# Patient Record
Sex: FEMALE | Race: WHITE | NOT HISPANIC OR LATINO | Employment: FULL TIME | URBAN - METROPOLITAN AREA
[De-identification: names, ages, dates, MRNs, and addresses within clinical notes are randomized per-mention and may not be internally consistent; named-entity substitution may affect disease eponyms.]

---

## 2017-11-25 ENCOUNTER — APPOINTMENT (OUTPATIENT)
Dept: GENERAL RADIOLOGY | Facility: HOSPITAL | Age: 57
End: 2017-11-25

## 2017-11-25 ENCOUNTER — APPOINTMENT (OUTPATIENT)
Dept: CT IMAGING | Facility: HOSPITAL | Age: 57
End: 2017-11-25

## 2017-11-25 ENCOUNTER — HOSPITAL ENCOUNTER (EMERGENCY)
Facility: HOSPITAL | Age: 57
Discharge: HOME | End: 2017-11-25
Attending: EMERGENCY MEDICINE | Admitting: EMERGENCY MEDICINE

## 2017-11-25 VITALS
HEART RATE: 99 BPM | SYSTOLIC BLOOD PRESSURE: 136 MMHG | TEMPERATURE: 98.9 F | BODY MASS INDEX: 27.6 KG/M2 | HEIGHT: 62 IN | RESPIRATION RATE: 17 BRPM | DIASTOLIC BLOOD PRESSURE: 87 MMHG | WEIGHT: 150 LBS | OXYGEN SATURATION: 94 %

## 2017-11-25 DIAGNOSIS — B34.9 VIRAL SYNDROME: Primary | ICD-10-CM

## 2017-11-25 DIAGNOSIS — K62.5 RECTAL BLEEDING: ICD-10-CM

## 2017-11-25 LAB
ALBUMIN SERPL-MCNC: 4.3 G/DL (ref 3.5–5.2)
ALBUMIN/GLOB SERPL: 1.4 G/DL
ALP SERPL-CCNC: 90 U/L (ref 39–117)
ALT SERPL W P-5'-P-CCNC: 61 U/L (ref 1–33)
ANION GAP SERPL CALCULATED.3IONS-SCNC: 19.3 MMOL/L
AST SERPL-CCNC: 32 U/L (ref 1–32)
B PERT DNA SPEC QL NAA+PROBE: NOT DETECTED
BASOPHILS # BLD AUTO: 0.02 10*3/MM3 (ref 0–0.2)
BASOPHILS NFR BLD AUTO: 0.1 % (ref 0–1.5)
BILIRUB SERPL-MCNC: 0.9 MG/DL (ref 0.1–1.2)
BUN BLD-MCNC: 6 MG/DL (ref 6–20)
BUN/CREAT SERPL: 8.5 (ref 7–25)
C PNEUM DNA NPH QL NAA+NON-PROBE: NOT DETECTED
CALCIUM SPEC-SCNC: 8.7 MG/DL (ref 8.6–10.5)
CHLORIDE SERPL-SCNC: 94 MMOL/L (ref 98–107)
CO2 SERPL-SCNC: 20.7 MMOL/L (ref 22–29)
CREAT BLD-MCNC: 0.71 MG/DL (ref 0.57–1)
D-LACTATE SERPL-SCNC: 2.6 MMOL/L (ref 0.5–2)
DEPRECATED RDW RBC AUTO: 46.9 FL (ref 37–54)
EOSINOPHIL # BLD AUTO: 0.01 10*3/MM3 (ref 0–0.7)
EOSINOPHIL NFR BLD AUTO: 0.1 % (ref 0.3–6.2)
ERYTHROCYTE [DISTWIDTH] IN BLOOD BY AUTOMATED COUNT: 13 % (ref 11.7–13)
FLUAV H1 2009 PAND RNA NPH QL NAA+PROBE: NOT DETECTED
FLUAV H1 HA GENE NPH QL NAA+PROBE: NOT DETECTED
FLUAV H3 RNA NPH QL NAA+PROBE: NOT DETECTED
FLUAV SUBTYP SPEC NAA+PROBE: NOT DETECTED
FLUBV RNA ISLT QL NAA+PROBE: NOT DETECTED
GFR SERPL CREATININE-BSD FRML MDRD: 85 ML/MIN/1.73
GLOBULIN UR ELPH-MCNC: 3.1 GM/DL
GLUCOSE BLD-MCNC: 207 MG/DL (ref 65–99)
HADV DNA SPEC NAA+PROBE: NOT DETECTED
HCOV 229E RNA SPEC QL NAA+PROBE: NOT DETECTED
HCOV HKU1 RNA SPEC QL NAA+PROBE: NOT DETECTED
HCOV NL63 RNA SPEC QL NAA+PROBE: NOT DETECTED
HCOV OC43 RNA SPEC QL NAA+PROBE: NOT DETECTED
HCT VFR BLD AUTO: 41.7 % (ref 35.6–45.5)
HGB BLD-MCNC: 14.9 G/DL (ref 11.9–15.5)
HMPV RNA NPH QL NAA+NON-PROBE: NOT DETECTED
HOLD SPECIMEN: NORMAL
HPIV1 RNA SPEC QL NAA+PROBE: NOT DETECTED
HPIV2 RNA SPEC QL NAA+PROBE: NOT DETECTED
HPIV3 RNA NPH QL NAA+PROBE: NOT DETECTED
HPIV4 P GENE NPH QL NAA+PROBE: NOT DETECTED
IMM GRANULOCYTES # BLD: 0.05 10*3/MM3 (ref 0–0.03)
IMM GRANULOCYTES NFR BLD: 0.4 % (ref 0–0.5)
LYMPHOCYTES # BLD AUTO: 1.31 10*3/MM3 (ref 0.9–4.8)
LYMPHOCYTES NFR BLD AUTO: 9.6 % (ref 19.6–45.3)
M PNEUMO IGG SER IA-ACNC: NOT DETECTED
MCH RBC QN AUTO: 35 PG (ref 26.9–32)
MCHC RBC AUTO-ENTMCNC: 35.7 G/DL (ref 32.4–36.3)
MCV RBC AUTO: 97.9 FL (ref 80.5–98.2)
MONOCYTES # BLD AUTO: 0.85 10*3/MM3 (ref 0.2–1.2)
MONOCYTES NFR BLD AUTO: 6.3 % (ref 5–12)
NEUTROPHILS # BLD AUTO: 11.34 10*3/MM3 (ref 1.9–8.1)
NEUTROPHILS NFR BLD AUTO: 83.5 % (ref 42.7–76)
NT-PROBNP SERPL-MCNC: 35.9 PG/ML (ref 0–900)
PLATELET # BLD AUTO: 243 10*3/MM3 (ref 140–500)
PMV BLD AUTO: 9.9 FL (ref 6–12)
POTASSIUM BLD-SCNC: 3.9 MMOL/L (ref 3.5–5.2)
PROCALCITONIN SERPL-MCNC: 0.05 NG/ML (ref 0.1–0.25)
PROT SERPL-MCNC: 7.4 G/DL (ref 6–8.5)
RBC # BLD AUTO: 4.26 10*6/MM3 (ref 3.9–5.2)
RHINOVIRUS RNA SPEC NAA+PROBE: NOT DETECTED
RSV RNA NPH QL NAA+NON-PROBE: NOT DETECTED
SODIUM BLD-SCNC: 134 MMOL/L (ref 136–145)
TROPONIN T SERPL-MCNC: <0.01 NG/ML (ref 0–0.03)
WBC NRBC COR # BLD: 13.58 10*3/MM3 (ref 4.5–10.7)
WHOLE BLOOD HOLD SPECIMEN: NORMAL
WHOLE BLOOD HOLD SPECIMEN: NORMAL

## 2017-11-25 PROCEDURE — 96361 HYDRATE IV INFUSION ADD-ON: CPT

## 2017-11-25 PROCEDURE — 99284 EMERGENCY DEPT VISIT MOD MDM: CPT

## 2017-11-25 PROCEDURE — 87581 M.PNEUMON DNA AMP PROBE: CPT | Performed by: EMERGENCY MEDICINE

## 2017-11-25 PROCEDURE — 87633 RESP VIRUS 12-25 TARGETS: CPT | Performed by: EMERGENCY MEDICINE

## 2017-11-25 PROCEDURE — 83880 ASSAY OF NATRIURETIC PEPTIDE: CPT | Performed by: EMERGENCY MEDICINE

## 2017-11-25 PROCEDURE — 71250 CT THORAX DX C-: CPT

## 2017-11-25 PROCEDURE — 84484 ASSAY OF TROPONIN QUANT: CPT | Performed by: EMERGENCY MEDICINE

## 2017-11-25 PROCEDURE — 71020 HC CHEST PA AND LATERAL: CPT

## 2017-11-25 PROCEDURE — 87486 CHLMYD PNEUM DNA AMP PROBE: CPT | Performed by: EMERGENCY MEDICINE

## 2017-11-25 PROCEDURE — 93010 ELECTROCARDIOGRAM REPORT: CPT | Performed by: INTERNAL MEDICINE

## 2017-11-25 PROCEDURE — 93005 ELECTROCARDIOGRAM TRACING: CPT | Performed by: EMERGENCY MEDICINE

## 2017-11-25 PROCEDURE — 87040 BLOOD CULTURE FOR BACTERIA: CPT | Performed by: EMERGENCY MEDICINE

## 2017-11-25 PROCEDURE — 83605 ASSAY OF LACTIC ACID: CPT | Performed by: EMERGENCY MEDICINE

## 2017-11-25 PROCEDURE — 85025 COMPLETE CBC W/AUTO DIFF WBC: CPT | Performed by: EMERGENCY MEDICINE

## 2017-11-25 PROCEDURE — 84145 PROCALCITONIN (PCT): CPT | Performed by: EMERGENCY MEDICINE

## 2017-11-25 PROCEDURE — 25010000002 KETOROLAC TROMETHAMINE PER 15 MG: Performed by: EMERGENCY MEDICINE

## 2017-11-25 PROCEDURE — 36415 COLL VENOUS BLD VENIPUNCTURE: CPT

## 2017-11-25 PROCEDURE — 96374 THER/PROPH/DIAG INJ IV PUSH: CPT

## 2017-11-25 PROCEDURE — 80053 COMPREHEN METABOLIC PANEL: CPT | Performed by: EMERGENCY MEDICINE

## 2017-11-25 PROCEDURE — 87798 DETECT AGENT NOS DNA AMP: CPT | Performed by: EMERGENCY MEDICINE

## 2017-11-25 RX ORDER — SODIUM CHLORIDE 0.9 % (FLUSH) 0.9 %
10 SYRINGE (ML) INJECTION AS NEEDED
Status: DISCONTINUED | OUTPATIENT
Start: 2017-11-25 | End: 2017-11-25 | Stop reason: HOSPADM

## 2017-11-25 RX ORDER — ACETAMINOPHEN 500 MG
1000 TABLET ORAL ONCE
Status: COMPLETED | OUTPATIENT
Start: 2017-11-25 | End: 2017-11-25

## 2017-11-25 RX ORDER — KETOROLAC TROMETHAMINE 30 MG/ML
30 INJECTION, SOLUTION INTRAMUSCULAR; INTRAVENOUS ONCE
Status: COMPLETED | OUTPATIENT
Start: 2017-11-25 | End: 2017-11-25

## 2017-11-25 RX ORDER — ACETAMINOPHEN 325 MG/1
975 TABLET ORAL ONCE
Status: DISCONTINUED | OUTPATIENT
Start: 2017-11-25 | End: 2017-11-25

## 2017-11-25 RX ORDER — ACETAMINOPHEN 500 MG
TABLET ORAL
Status: COMPLETED
Start: 2017-11-25 | End: 2017-11-25

## 2017-11-25 RX ADMIN — KETOROLAC TROMETHAMINE 30 MG: 30 INJECTION, SOLUTION INTRAMUSCULAR; INTRAVENOUS at 13:00

## 2017-11-25 RX ADMIN — Medication 1000 MG: at 13:00

## 2017-11-25 RX ADMIN — SODIUM CHLORIDE 1000 ML: 9 INJECTION, SOLUTION INTRAVENOUS at 13:00

## 2017-11-25 RX ADMIN — ACETAMINOPHEN 1000 MG: 500 TABLET ORAL at 13:00

## 2017-11-25 NOTE — ED PROVIDER NOTES
EMERGENCY DEPARTMENT ENCOUNTER    CHIEF COMPLAINT  Chief Complaint: flu-like symptoms  History given by: pt   History limited by: none  Room Number: 09/09   PMD: Shahzad Romero MD      HPI:  Pt is a 57 y.o. female who presents complaining of flu-like symptoms for several weeks. Pt c/o productive cough with clear sputum, congestion, chills, lowered appetite, and generalized weakness for several weeks, that have worsened for the past week and also c/o bright red blood in her stool for several days. Pt denies abd pain, nausea, or fever. Pt states that her grandchild currently has RSV. Pt states a Hx of hemorrhoids. Pt denies a Hx of abd surgeries.     Duration:  Several days  Onset: gradual   Timing: constant   Quality: flu-like symptoms   Intensity/Severity: moderate   Progression: increased for 1 week  Associated Symptoms: cough, congestion, chills, lowered appetite, generalized weakness, bloody stool  Aggravating Factors: none stated   Alleviating Factors: none stated   Previous Episodes: none stated   Treatment before arrival: none stated     PAST MEDICAL HISTORY  Active Ambulatory Problems     Diagnosis Date Noted   • No Active Ambulatory Problems     Resolved Ambulatory Problems     Diagnosis Date Noted   • No Resolved Ambulatory Problems     Past Medical History:   Diagnosis Date   • Anxiety    • Choroid melanoma of left eye        PAST SURGICAL HISTORY  Past Surgical History:   Procedure Laterality Date   • BREAST BIOPSY Left        FAMILY HISTORY  History reviewed. No pertinent family history.    SOCIAL HISTORY  Social History     Social History   • Marital status:      Spouse name: N/A   • Number of children: N/A   • Years of education: N/A     Occupational History   • Not on file.     Social History Main Topics   • Smoking status: Current Every Day Smoker   • Smokeless tobacco: Not on file      Comment: e-cig   • Alcohol use 8.4 oz/week     14 Shots of liquor per week   • Drug use: No   • Sexual  activity: Not on file     Other Topics Concern   • Not on file     Social History Narrative   • No narrative on file       ALLERGIES  Review of patient's allergies indicates no known allergies.    REVIEW OF SYSTEMS  Review of Systems   Constitutional: Positive for appetite change (decreased) and chills. Negative for fever.   HENT: Positive for congestion. Negative for sore throat.    Eyes: Negative.    Respiratory: Positive for cough (with clear sputum). Negative for shortness of breath.    Cardiovascular: Negative for chest pain.   Gastrointestinal: Positive for blood in stool. Negative for abdominal pain, diarrhea and vomiting.   Genitourinary: Negative for dysuria.   Musculoskeletal: Negative for neck pain.   Skin: Negative for rash.   Allergic/Immunologic: Negative.    Neurological: Positive for weakness (generalized). Negative for numbness and headaches.   Hematological: Negative.    Psychiatric/Behavioral: Negative.    All other systems reviewed and are negative.      PHYSICAL EXAM  ED Triage Vitals   Temp Heart Rate Resp BP SpO2   11/25/17 1132 11/25/17 1132 11/25/17 1132 11/25/17 1159 11/25/17 1132   99 °F (37.2 °C) 134 16 149/113 97 %      Temp src Heart Rate Source Patient Position BP Location FiO2 (%)   11/25/17 1132 11/25/17 1132 -- -- --   Tympanic Monitor          Physical Exam   Constitutional: She is oriented to person, place, and time and well-developed, well-nourished, and in no distress. No distress.   HENT:   Head: Normocephalic and atraumatic.   Eyes: EOM are normal. Pupils are equal, round, and reactive to light.   Neck: Normal range of motion. Neck supple.   Cardiovascular: Regular rhythm and normal heart sounds.  Tachycardia present.    Pulmonary/Chest: Effort normal. No respiratory distress. She has decreased breath sounds (bilateral bases).   Abdominal: Soft. There is no tenderness. There is no rebound and no guarding.   Musculoskeletal: Normal range of motion. She exhibits no edema.    Neurological: She is alert and oriented to person, place, and time. She has normal sensation and normal strength.   Skin: Skin is warm and dry. No rash noted.   Psychiatric: Mood and affect normal.   Nursing note and vitals reviewed.      LAB RESULTS  Lab Results (last 24 hours)     Procedure Component Value Units Date/Time    CBC & Differential [[id number]] Collected:  11/25/17 1208    Specimen:  Blood Updated:  11/25/17 1216    Narrative:       The following orders were created for panel order CBC & Differential.  Procedure                               Abnormality         Status                     ---------                               -----------         ------                     CBC Auto Differential[[id number]]        Abnormal            Final result                 Please view results for these tests on the individual orders.    Comprehensive Metabolic Panel [[id number]]  (Abnormal) Collected:  11/25/17 1208    Specimen:  Blood Updated:  11/25/17 1241     Glucose 207 (H) mg/dL      BUN 6 mg/dL      Creatinine 0.71 mg/dL      Sodium 134 (L) mmol/L      Potassium 3.9 mmol/L      Chloride 94 (L) mmol/L      CO2 20.7 (L) mmol/L      Calcium 8.7 mg/dL      Total Protein 7.4 g/dL      Albumin 4.30 g/dL      ALT (SGPT) 61 (H) U/L      AST (SGOT) 32 U/L      Alkaline Phosphatase 90 U/L      Total Bilirubin 0.9 mg/dL      eGFR Non African Amer 85 mL/min/1.73      Globulin 3.1 gm/dL      A/G Ratio 1.4 g/dL      BUN/Creatinine Ratio 8.5     Anion Gap 19.3 mmol/L     Procalcitonin [[id number]]  (Abnormal) Collected:  11/25/17 1208    Specimen:  Blood Updated:  11/25/17 1246     Procalcitonin 0.05 (L) ng/mL     Narrative:       As a Marker for Sepsis (Non-Neonates):   1. <0.5 ng/mL represents a low risk of severe sepsis and/or septic shock.  1. >2 ng/mL represents a high risk of severe sepsis and/or septic shock.    As a Marker for Lower Respiratory Tract Infections that require antibiotic therapy:  PCT on Admission      "Antibiotic Therapy             6-12 Hrs later  > 0.5                Strongly Recommended            >0.25 - <0.5         Recommended  0.1 - 0.25           Discouraged                   Remeasure/reassess PCT  <0.1                 Strongly Discouraged          Remeasure/reassess PCT      As 28 day mortality risk marker: \"Change in Procalcitonin Result\" (> 80 % or <=80 %) if Day 0 (or Day 1) and Day 4 values are available. Refer to [email or web address]-[email or web address]/   Change in PCT <=80 %   A decrease of PCT levels below or equal to 80 % defines a positive change in PCT test result representing a higher risk for 28-day all-cause mortality of patients diagnosed with severe sepsis or septic shock.  Change in PCT > 80 %   A decrease of PCT levels of more than 80 % defines a negative change in PCT result representing a lower risk for 28-day all-cause mortality of patients diagnosed with severe sepsis or septic shock.                Troponin [[id number]]  (Normal) Collected:  11/25/17 1208    Specimen:  Blood Updated:  11/25/17 1246     Troponin T <0.010 ng/mL     Narrative:       Troponin T Reference Ranges:  Less than 0.03 ng/mL:    Negative for AMI  0.03 to 0.09 ng/mL:      Indeterminant for AMI  Greater than 0.09 ng/mL: Positive for AMI    BNP [[id number]]  (Normal) Collected:  11/25/17 1208    Specimen:  Blood Updated:  11/25/17 1246     proBNP 35.9 pg/mL     Narrative:       Among patients with dyspnea, NT-proBNP is highly sensitive for the detection of acute congestive heart failure. In addition NT-proBNP of <300 pg/ml effectively rules out acute congestive heart failure with 99% negative predictive value.    CBC Auto Differential [[id number]]  (Abnormal) Collected:  11/25/17 1208    Specimen:  Blood Updated:  11/25/17 1216     WBC 13.58 (H) 10*3/mm3      RBC 4.26 10*6/mm3      Hemoglobin 14.9 g/dL      Hematocrit 41.7 %      MCV 97.9 fL      MCH 35.0 (H) pg      MCHC 35.7 g/dL      RDW 13.0 %      RDW-SD 46.9 fl  "     MPV 9.9 fL      Platelets 243 10*3/mm3      Neutrophil % 83.5 (H) %      Lymphocyte % 9.6 (L) %      Monocyte % 6.3 %      Eosinophil % 0.1 (L) %      Basophil % 0.1 %      Immature Grans % 0.4 %      Neutrophils, Absolute 11.34 (H) 10*3/mm3      Lymphocytes, Absolute 1.31 10*3/mm3      Monocytes, Absolute 0.85 10*3/mm3      Eosinophils, Absolute 0.01 10*3/mm3      Basophils, Absolute 0.02 10*3/mm3      Immature Grans, Absolute 0.05 (H) 10*3/mm3     Blood Culture - Blood, [[id number]] Collected:  11/25/17 1259    Specimen:  Blood from Arm, Left Updated:  11/25/17 1305    Lactic Acid, Plasma [[id number]]  (Abnormal) Collected:  11/25/17 1259    Specimen:  Blood from Arm, Left Updated:  11/25/17 1338     Lactate 2.6 (C) mmol/L     Lactic Acid, Reflex Timer [[id number]] Collected:  11/25/17 1259    Specimen:  Blood from Arm, Left Updated:  11/25/17 1337    Respiratory Panel, PCR - Swab, Nasopharynx [[id number]]  (Normal) Collected:  11/25/17 1300    Specimen:  Swab from Nasopharynx Updated:  11/25/17 1423     ADENOVIRUS, PCR Not Detected     Coronavirus 229E Not Detected     Coronavirus HKU1 Not Detected     Coronavirus NL63 Not Detected     Coronavirus OC43 Not Detected     Human Metapneumovirus Not Detected     Human Rhinovirus/Enterovirus Not Detected     Influenza B PCR Not Detected     Parainfluenza Virus 1 Not Detected     Parainfluenza Virus 2 Not Detected     Parainfluenza Virus 3 Not Detected     Parainfluenza Virus 4 Not Detected     Bordetella pertussis pcr Not Detected     Influenza 2009 H1N1 by PCR Not Detected     Chlamydophila pneumoniae PCR Not Detected     Mycoplasma pneumo by PCR Not Detected     Influenza A PCR Not Detected     Influenza A H3 Not Detected     Influenza A H1 Not Detected     RSV, PCR Not Detected          I ordered the above labs and reviewed the results    RADIOLOGY  CT Chest Without Contrast   Final Result           1. No focal pulmonary consolidation or mass.     2. Hepatic  steatosis.       Discussed by telephone with Dr. Wan at time of interpretation, 1530,   11/25/2017.       This report was finalized on 11/25/2017 3:28 PM by Dr. Miguel Trinidad MD.          XR Chest 2 View              I ordered the above noted radiological studies. Interpreted by radiologist. Reviewed by me in PACS.       PROCEDURES  Procedures  EKG           EKG time: 1302  Rhythm/Rate: sinus tachycardia 120  P waves and PA: normal   QRS, axis: normal    ST and T waves: normal      Interpreted Contemporaneously by me, independently viewed  unchanged compared to prior 3/9/16      PROGRESS AND CONSULTS  ED Course   1210  Ordered labs, CXR, and EKG for further evaluation, and IV fluids for hydration.   1215  Ordered IV fluids for hydration and tylenol for fever.   1246  Ordered toradol for pain.  1337  Ordered lactic acid for further evaluation.   1434  Rechecked pt, who is resting comfortably. Discussed the pt's CXR which was read as normal and a normal viral panel. Plan to CT the pt's chest for evaluation for pneumonia. Pt understands and agrees with the plan, and all questions were answered.   1443  Ordered CT chest for further evaluation.   1531  Rechecked pt, who is resting comfortably. Discussed the pt's CT chest, which was negative. Pt does not remember the name of the PCP that she sees and does not see Dr. Romero anymore. Plan to d/c the pt with a referral to a PCP. Advised a colonoscopy to evaluate her gastric bleeding. Pt understands and agrees with the plan, and all questions were answered.     MEDICAL DECISION MAKING  Results were reviewed/discussed with the patient and they were also made aware of online access. Pt also made aware that some labs, such as cultures, will not be resulted during ER visit and follow up with PMD is necessary.     MDM  Number of Diagnoses or Management Options  Rectal bleeding:   Viral syndrome:      Amount and/or Complexity of Data Reviewed  Clinical lab tests:  reviewed and ordered (Respiratory panel: negative, Lactate 2.6, Troponin <0.010, BNP 35.9, WBC 13.58)  Tests in the radiology section of CPT®: reviewed and ordered (CT chest: hepatic steatosis, no pulmonary consolidation. CXR: negative )  Tests in the medicine section of CPT®: reviewed and ordered (See procedures section for EKG. )    Patient Progress  Patient progress: stable         DIAGNOSIS  Final diagnoses:   Viral syndrome   Rectal bleeding       DISPOSITION  DISCHARGE    Patient discharged in stable condition.    Reviewed implications of results, diagnosis, meds, responsibility to follow up, warning signs and symptoms of possible worsening, potential complications and reasons to return to ER.    Patient/Family voiced understanding of above instructions.    Discussed plan for discharge, as there is no emergent indication for admission.  Pt/family is agreeable and understands need for follow up and repeat testing.  Pt is aware that discharge does not mean that nothing is wrong but it indicates no emergency is present that requires admission and they must continue care with follow-up as given below or physician of their choice.     FOLLOW-UP  [facility] PHYSICAN REFERRAL SERVICE  [facility] [address] [phone]  Call      [facility] Emergency Department  [address]  [facility] [address] [phone]    If symptoms worsen         Medication List      Notice     No changes were made to your prescriptions during this visit.            Latest Documented Vital Signs:  As of 3:43 PM  BP- 142/94 HR- 108 Temp- 99.1 °F (37.3 °C) (Oral) O2 sat- 95%    --  Documentation assistance provided by leonard Cuello for Dr. Wan.  Information recorded by the leonard was done at my direction and has been verified and validated by me.       Daron Cuello  11/25/17 0552       Waylon Wan MD  11/25/17 8596

## 2017-11-27 ENCOUNTER — TELEPHONE (OUTPATIENT)
Dept: SOCIAL WORK | Facility: HOSPITAL | Age: 57
End: 2017-11-27

## 2017-11-27 NOTE — TELEPHONE ENCOUNTER
ED f/u phone call: states that she feels about the same. Reminded re RTER as needed. Does not have a PCP; CCP will contact her tomorrow re getting shelby't w/ a new PCP> NO questions/concerns

## 2017-11-28 ENCOUNTER — TELEPHONE (OUTPATIENT)
Dept: SOCIAL WORK | Facility: HOSPITAL | Age: 57
End: 2017-11-28

## 2017-11-29 ENCOUNTER — TELEPHONE (OUTPATIENT)
Dept: SOCIAL WORK | Facility: HOSPITAL | Age: 57
End: 2017-11-29

## 2017-11-30 LAB
BACTERIA SPEC AEROBE CULT: NORMAL
BACTERIA SPEC AEROBE CULT: NORMAL

## 2017-12-12 ENCOUNTER — OFFICE VISIT (OUTPATIENT)
Dept: FAMILY MEDICINE CLINIC | Facility: CLINIC | Age: 57
End: 2017-12-12

## 2017-12-12 VITALS
BODY MASS INDEX: 28.52 KG/M2 | HEART RATE: 112 BPM | DIASTOLIC BLOOD PRESSURE: 80 MMHG | HEIGHT: 62 IN | WEIGHT: 155 LBS | TEMPERATURE: 98 F | RESPIRATION RATE: 18 BRPM | OXYGEN SATURATION: 100 % | SYSTOLIC BLOOD PRESSURE: 138 MMHG

## 2017-12-12 DIAGNOSIS — F41.9 ANXIETY: ICD-10-CM

## 2017-12-12 DIAGNOSIS — R53.83 OTHER FATIGUE: ICD-10-CM

## 2017-12-12 DIAGNOSIS — B37.0 THRUSH: ICD-10-CM

## 2017-12-12 DIAGNOSIS — R73.9 ELEVATED SERUM GLUCOSE: Primary | ICD-10-CM

## 2017-12-12 DIAGNOSIS — K62.5 RECTAL BLEEDING: Primary | ICD-10-CM

## 2017-12-12 DIAGNOSIS — R73.9 HYPERGLYCEMIA: ICD-10-CM

## 2017-12-12 LAB
ALBUMIN SERPL-MCNC: 4.2 G/DL (ref 3.5–5.2)
ALBUMIN/GLOB SERPL: 1.2 G/DL
ALP SERPL-CCNC: 120 U/L (ref 39–117)
ALT SERPL W P-5'-P-CCNC: 178 U/L (ref 1–33)
ANION GAP SERPL CALCULATED.3IONS-SCNC: 19.7 MMOL/L
AST SERPL-CCNC: 158 U/L (ref 1–32)
BILIRUB SERPL-MCNC: 0.6 MG/DL (ref 0.1–1.2)
BUN BLD-MCNC: 8 MG/DL (ref 6–20)
BUN/CREAT SERPL: 11.3 (ref 7–25)
CALCIUM SPEC-SCNC: 9.1 MG/DL (ref 8.6–10.5)
CHLORIDE SERPL-SCNC: 90 MMOL/L (ref 98–107)
CO2 SERPL-SCNC: 23.3 MMOL/L (ref 22–29)
CREAT BLD-MCNC: 0.71 MG/DL (ref 0.57–1)
GFR SERPL CREATININE-BSD FRML MDRD: 85 ML/MIN/1.73
GLOBULIN UR ELPH-MCNC: 3.6 GM/DL
GLUCOSE BLD-MCNC: 161 MG/DL (ref 65–99)
GLUCOSE BLDC GLUCOMTR-MCNC: 155 MG/DL (ref 70–130)
HBA1C MFR BLD: 5.8 % (ref 4.8–5.6)
POTASSIUM BLD-SCNC: 4.8 MMOL/L (ref 3.5–5.2)
PROT SERPL-MCNC: 7.8 G/DL (ref 6–8.5)
SODIUM BLD-SCNC: 133 MMOL/L (ref 136–145)
T-UPTAKE NFR SERPL: 1.05 TBI (ref 0.8–1.3)
T4 SERPL-MCNC: 6.56 MCG/DL (ref 4.5–11.7)
TSH SERPL DL<=0.05 MIU/L-ACNC: 1.55 MIU/ML (ref 0.27–4.2)

## 2017-12-12 PROCEDURE — 84436 ASSAY OF TOTAL THYROXINE: CPT | Performed by: FAMILY MEDICINE

## 2017-12-12 PROCEDURE — 82962 GLUCOSE BLOOD TEST: CPT | Performed by: FAMILY MEDICINE

## 2017-12-12 PROCEDURE — 84443 ASSAY THYROID STIM HORMONE: CPT | Performed by: FAMILY MEDICINE

## 2017-12-12 PROCEDURE — 99204 OFFICE O/P NEW MOD 45 MIN: CPT | Performed by: FAMILY MEDICINE

## 2017-12-12 PROCEDURE — 36415 COLL VENOUS BLD VENIPUNCTURE: CPT | Performed by: FAMILY MEDICINE

## 2017-12-12 PROCEDURE — 84479 ASSAY OF THYROID (T3 OR T4): CPT | Performed by: FAMILY MEDICINE

## 2017-12-12 PROCEDURE — 83036 HEMOGLOBIN GLYCOSYLATED A1C: CPT | Performed by: FAMILY MEDICINE

## 2017-12-12 PROCEDURE — 80053 COMPREHEN METABOLIC PANEL: CPT | Performed by: FAMILY MEDICINE

## 2017-12-12 RX ORDER — ACETAMINOPHEN,DIPHENHYDRAMINE HCL 500; 25 MG/1; MG/1
1 TABLET, FILM COATED ORAL NIGHTLY PRN
COMMUNITY
End: 2021-07-02

## 2017-12-12 RX ORDER — LORAZEPAM 1 MG/1
1 TABLET ORAL EVERY 8 HOURS PRN
Qty: 20 TABLET | Refills: 0 | Status: SHIPPED | OUTPATIENT
Start: 2017-12-12 | End: 2017-12-20 | Stop reason: SDUPTHER

## 2017-12-12 NOTE — PROGRESS NOTES
SUBJECTIVE:  The patient is a 57-year-old white female comes in for follow-up.  She was seen in emergency room on 11/25/17.  Please refer to that report for specific details.  She complains of rectal bleeding fatigue anxiety and that her throat feels like it's closing up.  They'll thyroid studies have been done.  She had a glucose of 207.     PAST MEDICAL HISTORY:  Reviewed.    REVIEW OF SYSTEMS:  Please see above; 14 point ROS otherwise negative.      OBJECTIVE: Vitals signs are reviewed and are stable.  Patient is alert and oriented and appears anxious.  HEENT: PERRLA.  []Tongue is coated and quite  Neck:  Supple.  []  Lungs:  Clear.    Heart:  Tachycardic and regular  []  Abdomen:   Soft, nontender.  []Sounds present  Extremities:  No cyanosis, clubbing or edema.  []    ASSESSMENT:    []Hyperglycemia probably type 2 diabetes  Tachycardia  Rectal bleeding  Oral thrush  Anxiety    PLAN:  []CMP TSH thyroid profile hemoglobin A1c ordered.  Mycostatin swish and swallow 4 times a day.  Patient is referred for colonoscopy.  She will follow up on her labs.  She will call if problems.  She'll go to the emergency room if any significant concerns or problems.  She will make a follow-up visit in the next few days.  Fasting blood sugar is 155.  Ativan 1 mg every 8 hours when necessary.  Patient warned of habituation.    Much of this encounter note is an electronic transcription/translation of spoken language to printed text.  The electronic translation of spoken language may permit erroneous, or at times, nonsensical words or phrases to be inadvertently transcribed.  Although I have reviewed the note for such errors, some may still exist.

## 2017-12-13 DIAGNOSIS — R74.8 ELEVATED LIVER ENZYMES: Primary | ICD-10-CM

## 2017-12-19 LAB
ALBUMIN SERPL-MCNC: 4.2 G/DL (ref 3.5–5.2)
ALP SERPL-CCNC: 81 U/L (ref 39–117)
ALT SERPL W P-5'-P-CCNC: 205 U/L (ref 1–33)
ANION GAP SERPL CALCULATED.3IONS-SCNC: 15.5 MMOL/L
AST SERPL-CCNC: 106 U/L (ref 1–32)
BILIRUB CONJ SERPL-MCNC: <0.2 MG/DL (ref 0–0.3)
BILIRUB INDIRECT SERPL-MCNC: ABNORMAL MG/DL
BILIRUB SERPL-MCNC: 0.5 MG/DL (ref 0.1–1.2)
BUN BLD-MCNC: 11 MG/DL (ref 6–20)
BUN/CREAT SERPL: 15.3 (ref 7–25)
CALCIUM SPEC-SCNC: 9.3 MG/DL (ref 8.6–10.5)
CHLORIDE SERPL-SCNC: 101 MMOL/L (ref 98–107)
CO2 SERPL-SCNC: 24.5 MMOL/L (ref 22–29)
CREAT BLD-MCNC: 0.72 MG/DL (ref 0.57–1)
GFR SERPL CREATININE-BSD FRML MDRD: 84 ML/MIN/1.73
GLUCOSE BLD-MCNC: 130 MG/DL (ref 65–99)
HAV IGM SERPL QL IA: NORMAL
HBV CORE IGM SERPL QL IA: NORMAL
HBV SURFACE AG SERPL QL IA: NORMAL
HCV AB SER DONR QL: NORMAL
POTASSIUM BLD-SCNC: 4.3 MMOL/L (ref 3.5–5.2)
PROT SERPL-MCNC: 7.6 G/DL (ref 6–8.5)
SODIUM BLD-SCNC: 141 MMOL/L (ref 136–145)

## 2017-12-19 PROCEDURE — 80074 ACUTE HEPATITIS PANEL: CPT | Performed by: FAMILY MEDICINE

## 2017-12-19 PROCEDURE — 80076 HEPATIC FUNCTION PANEL: CPT | Performed by: FAMILY MEDICINE

## 2017-12-19 PROCEDURE — 80048 BASIC METABOLIC PNL TOTAL CA: CPT | Performed by: FAMILY MEDICINE

## 2017-12-20 ENCOUNTER — TELEPHONE (OUTPATIENT)
Dept: FAMILY MEDICINE CLINIC | Facility: CLINIC | Age: 57
End: 2017-12-20

## 2017-12-20 RX ORDER — LORAZEPAM 1 MG/1
1 TABLET ORAL EVERY 8 HOURS PRN
Qty: 20 TABLET | Refills: 0 | Status: SHIPPED | OUTPATIENT
Start: 2017-12-20 | End: 2019-09-04 | Stop reason: SDUPTHER

## 2017-12-20 NOTE — TELEPHONE ENCOUNTER
I was unaware that.  Be sure not to use those medications I gave you often since they are addicting potential he.

## 2017-12-20 NOTE — TELEPHONE ENCOUNTER
PATIENT IS RECOVERING FROM ALCOHOL AT THIS TIME. PATIENT HAS GONE A WEEK WITHOUT A DRINK OF ALCOHOL

## 2017-12-20 NOTE — TELEPHONE ENCOUNTER
12/20 Called and spoke with Darius () to inform him about med. He wants a copy of lab results. Putting them up at  for .

## 2017-12-27 ENCOUNTER — HOSPITAL ENCOUNTER (OUTPATIENT)
Dept: ULTRASOUND IMAGING | Facility: HOSPITAL | Age: 57
Discharge: HOME | End: 2017-12-27
Admitting: FAMILY MEDICINE

## 2017-12-27 DIAGNOSIS — R74.8 ELEVATED LIVER ENZYMES: ICD-10-CM

## 2017-12-27 PROCEDURE — 76705 ECHO EXAM OF ABDOMEN: CPT

## 2017-12-29 ENCOUNTER — TELEPHONE (OUTPATIENT)
Dept: FAMILY MEDICINE CLINIC | Facility: CLINIC | Age: 57
End: 2017-12-29

## 2017-12-29 NOTE — TELEPHONE ENCOUNTER
PT HAD [facility] AT [facility] AND DR. HASTINGS OFFICE, WOULD LIKE COPIES OF THE RESULTS THE FAX NUMBER [phone].

## 2018-01-09 ENCOUNTER — OFFICE VISIT (OUTPATIENT)
Dept: GASTROENTEROLOGY | Facility: CLINIC | Age: 58
End: 2018-01-09

## 2018-01-09 VITALS
DIASTOLIC BLOOD PRESSURE: 80 MMHG | WEIGHT: 150.4 LBS | SYSTOLIC BLOOD PRESSURE: 128 MMHG | HEIGHT: 62 IN | TEMPERATURE: 98.2 F | BODY MASS INDEX: 27.68 KG/M2

## 2018-01-09 DIAGNOSIS — R74.8 ELEVATED LIVER ENZYMES: Primary | ICD-10-CM

## 2018-01-09 DIAGNOSIS — Z12.11 ENCOUNTER FOR SCREENING FOR MALIGNANT NEOPLASM OF COLON: ICD-10-CM

## 2018-01-09 PROCEDURE — 99204 OFFICE O/P NEW MOD 45 MIN: CPT | Performed by: INTERNAL MEDICINE

## 2018-01-09 RX ORDER — SODIUM CHLORIDE, SODIUM LACTATE, POTASSIUM CHLORIDE, CALCIUM CHLORIDE 600; 310; 30; 20 MG/100ML; MG/100ML; MG/100ML; MG/100ML
30 INJECTION, SOLUTION INTRAVENOUS CONTINUOUS
Status: CANCELLED | OUTPATIENT
Start: 2018-02-12

## 2018-01-09 NOTE — PROGRESS NOTES
Chief Complaint   Patient presents with   • Elevated Hepatic Enzymes     Subjective      HPI     Glenis Bernardo is a 57 y.o. female who presents today for evaluation of elevated liver enzymes.  Apparetly pt saw her PCP back in December for some flu like symptoms (which have since resolved).  She had repeat LFTs 1 week later which showed a stable but  Persistent elevation.  Total bilirubin normal.  Acute hepatitis panel negative.  Abdominal u/s ordered by PCP showed diffuse hepatic steatosis but no other abnormalities.  Pt does note she was taking regular Tylenol PM int he month leading up to this episode.  She has hx of prior heavy EtOH use up until about 1 month ago.  No family of Gi related disorders.  No prior upper or lower endoscopy.  She has hx of ocular melanoma (treated successfully)      Past Medical History:   Diagnosis Date   • Anxiety    • Choroid melanoma of left eye        Current Outpatient Prescriptions:   •  Calcium Carbonate Antacid (ANTACID E-X PO), Take  by mouth., Disp: , Rfl:   •  diphenhydrAMINE-acetaminophen (TYLENOL PM)  MG tablet per tablet, Take 1 tablet by mouth At Night As Needed for Sleep., Disp: , Rfl:   •  LORazepam (ATIVAN) 1 MG tablet, Take 1 tablet by mouth Every 8 (Eight) Hours As Needed for Anxiety., Disp: 20 tablet, Rfl: 0  •  nystatin (MYCOSTATIN) 118281 UNIT/ML suspension, Take 5 mL by mouth 4 (Four) Times a Day. 5 mL PO QID x 5 days, retain in mouth as long as possible, use 48 hours after sx resolve, Disp: 120 mL, Rfl: 0     No Known Allergies  Social History     Social History   • Marital status:      Spouse name: N/A   • Number of children: N/A   • Years of education: N/A     Occupational History   • Not on file.     Social History Main Topics   • Smoking status: Current Every Day Smoker     Types: Electronic Cigarette   • Smokeless tobacco: Never Used      Comment: e-cig   • Alcohol use 8.4 oz/week     14 Shots of liquor per week   • Drug use: No   • Sexual  activity: Not on file     Other Topics Concern   • Not on file     Social History Narrative     Family History   Problem Relation Age of Onset   • Heart failure Mother    • Heart failure Father    • Diabetes Sister    • Diabetes Brother      Review of Systems   Constitutional: Negative.    HENT: Negative.    Cardiovascular: Negative.    Gastrointestinal: Negative.    All other systems reviewed and are negative.       Objective   Vitals:    01/09/18 1555   BP: 128/80   Temp: 98.2 °F (36.8 °C)     Physical Exam   Constitutional: She is oriented to person, place, and time. She appears well-developed and well-nourished.   HENT:   Head: Normocephalic and atraumatic.   Mouth/Throat: Oropharynx is clear and moist.   Eyes: EOM are normal. No scleral icterus.   Neck: Normal range of motion. Neck supple. No thyromegaly present.   Cardiovascular: Normal rate, regular rhythm and normal heart sounds.  Exam reveals no gallop and no friction rub.    No murmur heard.  Pulmonary/Chest: Effort normal and breath sounds normal. She has no wheezes. She has no rales. She exhibits no tenderness.   Abdominal: Soft. Bowel sounds are normal. She exhibits no distension. There is no tenderness. There is no rebound and no guarding. No hernia.   Musculoskeletal: Normal range of motion. She exhibits no edema.   Lymphadenopathy:     She has no cervical adenopathy.   Neurological: She is alert and oriented to person, place, and time.   Skin: Skin is warm and dry.   Psychiatric: She has a normal mood and affect. Judgment and thought content normal.   Vitals reviewed.       Assessment/Plan   Assessment:     1. Elevated liver enzymes    2. Encounter for screening for malignant neoplasm of colon      Plan:   The etiology of the patient's elevated liver enzymes is currently unclear.  She does have a history of prior heavy alcohol use although her pattern of elevation is not really consistent with alcoholic hepatitis.  She does have an element of  steatosis on her abdominal ultrasound but these numbers seem a little higher than I would expect from just fatty liver alone.  My initial impression is that she may have had some nonspecific viral hepatitis that she was having some flulike symptoms around the time of her initial blood work.  I'm going to repeat her CMP today to assess how her liver enzymes have involved over the last 3 weeks since her last check.  Also perform some additional serologic testing today to rule out an autoimmune etiology or other potential causes.    I have encouraged her to continue with complete alcohol abstinence.  If her LFTs remain elevated at her current levels and we will need to consider possible liver biopsy if an etiology is unclear.      Patient is also overdue for colonoscopy from a screening standpoint.  I placed an order for this to be scheduled at the patient's earliest convenience.        Juan Espinosa M.D.  [facility]  [address] [place] [address]  Office: [phone]

## 2018-01-10 LAB
25(OH)D3+25(OH)D2 SERPL-MCNC: 34 NG/ML (ref 30–100)
A1AT SERPL-MCNC: 150 MG/DL (ref 90–200)
ACTIN IGG SERPL-ACNC: 5 UNITS (ref 0–19)
ALBUMIN SERPL-MCNC: 4.2 G/DL (ref 3.5–5.2)
ALBUMIN/GLOB SERPL: 1.6 G/DL
ALP SERPL-CCNC: 71 U/L (ref 39–117)
ALT SERPL-CCNC: 40 U/L (ref 1–33)
ANA SER QL: NEGATIVE
AST SERPL-CCNC: 19 U/L (ref 1–32)
BASOPHILS # BLD AUTO: 0.04 10*3/MM3 (ref 0–0.2)
BASOPHILS NFR BLD AUTO: 0.4 % (ref 0–1.5)
BILIRUB SERPL-MCNC: 0.3 MG/DL (ref 0.1–1.2)
BUN SERPL-MCNC: 8 MG/DL (ref 6–20)
BUN/CREAT SERPL: 10.5 (ref 7–25)
CALCIUM SERPL-MCNC: 9.1 MG/DL (ref 8.6–10.5)
CHLORIDE SERPL-SCNC: 102 MMOL/L (ref 98–107)
CO2 SERPL-SCNC: 24 MMOL/L (ref 22–29)
CREAT SERPL-MCNC: 0.76 MG/DL (ref 0.57–1)
ENDOMYSIUM IGA SER QL: NEGATIVE
EOSINOPHIL # BLD AUTO: 0.1 10*3/MM3 (ref 0–0.7)
EOSINOPHIL NFR BLD AUTO: 1.1 % (ref 0.3–6.2)
ERYTHROCYTE [DISTWIDTH] IN BLOOD BY AUTOMATED COUNT: 12.6 % (ref 11.7–13)
FERRITIN SERPL-MCNC: 136.7 NG/ML (ref 13–150)
FOLATE SERPL-MCNC: 18.43 NG/ML (ref 4.78–24.2)
GLIADIN PEPTIDE IGA SER-ACNC: 9 UNITS (ref 0–19)
GLIADIN PEPTIDE IGG SER-ACNC: 4 UNITS (ref 0–19)
GLOBULIN SER CALC-MCNC: 2.6 GM/DL
GLUCOSE SERPL-MCNC: 151 MG/DL (ref 65–99)
HCT VFR BLD AUTO: 43.3 % (ref 35.6–45.5)
HGB BLD-MCNC: 14.3 G/DL (ref 11.9–15.5)
IGA SERPL-MCNC: 279 MG/DL (ref 87–352)
IGG SERPL-MCNC: 631 MG/DL (ref 700–1600)
IMM GRANULOCYTES # BLD: 0.02 10*3/MM3 (ref 0–0.03)
IMM GRANULOCYTES NFR BLD: 0.2 % (ref 0–0.5)
INR PPP: 0.98 (ref 0.9–1.1)
IRON SATN MFR SERPL: 22 % (ref 20–50)
IRON SERPL-MCNC: 91 MCG/DL (ref 37–145)
LYMPHOCYTES # BLD AUTO: 2.36 10*3/MM3 (ref 0.9–4.8)
LYMPHOCYTES NFR BLD AUTO: 25.3 % (ref 19.6–45.3)
MCH RBC QN AUTO: 33.5 PG (ref 26.9–32)
MCHC RBC AUTO-ENTMCNC: 33 G/DL (ref 32.4–36.3)
MCV RBC AUTO: 101.4 FL (ref 80.5–98.2)
MITOCHONDRIA M2 IGG SER-ACNC: <20 UNITS (ref 0–20)
MONOCYTES # BLD AUTO: 0.56 10*3/MM3 (ref 0.2–1.2)
MONOCYTES NFR BLD AUTO: 6 % (ref 5–12)
NEUTROPHILS # BLD AUTO: 6.23 10*3/MM3 (ref 1.9–8.1)
NEUTROPHILS NFR BLD AUTO: 67 % (ref 42.7–76)
PLATELET # BLD AUTO: 287 10*3/MM3 (ref 140–500)
POTASSIUM SERPL-SCNC: 3.8 MMOL/L (ref 3.5–5.2)
PROT SERPL-MCNC: 6.8 G/DL (ref 6–8.5)
PROTHROMBIN TIME: 12.6 SECONDS (ref 11.7–14.2)
RBC # BLD AUTO: 4.27 10*6/MM3 (ref 3.9–5.2)
SODIUM SERPL-SCNC: 140 MMOL/L (ref 136–145)
TIBC SERPL-MCNC: 417 MCG/DL
TTG IGA SER-ACNC: <2 U/ML (ref 0–3)
TTG IGG SER-ACNC: <2 U/ML (ref 0–5)
UIBC SERPL-MCNC: 326 MCG/DL
VIT B12 SERPL-MCNC: 856 PG/ML (ref 211–946)
WBC # BLD AUTO: 9.31 10*3/MM3 (ref 4.5–10.7)

## 2018-01-10 NOTE — PROGRESS NOTES
Liver enzymes have essentially come back to normal.  So far blood work to look for specific cause is negative  I will d/w pt in more detail at time of her colonoscopy

## 2018-01-17 ENCOUNTER — TELEPHONE (OUTPATIENT)
Dept: GASTROENTEROLOGY | Facility: CLINIC | Age: 58
End: 2018-01-17

## 2018-01-17 NOTE — TELEPHONE ENCOUNTER
----- Message from Juan Espinosa MD sent at 1/10/2018  4:35 PM EST -----  Liver enzymes have essentially come back to normal.  So far blood work to look for specific cause is negative  I will d/w pt in more detail at time of her colonoscopy  Patient called, spoke with her daughter Elizabeth Leal who is listed on the [facility] form. Advised as per Dr. Espinosa's note. She verb understanding and will relay the message to the patient.

## 2018-02-12 ENCOUNTER — ANESTHESIA (OUTPATIENT)
Dept: GASTROENTEROLOGY | Facility: HOSPITAL | Age: 58
End: 2018-02-12

## 2018-02-12 ENCOUNTER — HOSPITAL ENCOUNTER (OUTPATIENT)
Facility: HOSPITAL | Age: 58
Setting detail: HOSPITAL OUTPATIENT SURGERY
Discharge: HOME | End: 2018-02-12
Attending: INTERNAL MEDICINE | Admitting: INTERNAL MEDICINE

## 2018-02-12 ENCOUNTER — ANESTHESIA EVENT (OUTPATIENT)
Dept: GASTROENTEROLOGY | Facility: HOSPITAL | Age: 58
End: 2018-02-12

## 2018-02-12 VITALS
OXYGEN SATURATION: 100 % | TEMPERATURE: 97.8 F | SYSTOLIC BLOOD PRESSURE: 108 MMHG | WEIGHT: 149.38 LBS | DIASTOLIC BLOOD PRESSURE: 71 MMHG | HEART RATE: 76 BPM | HEIGHT: 62 IN | BODY MASS INDEX: 27.49 KG/M2 | RESPIRATION RATE: 16 BRPM

## 2018-02-12 DIAGNOSIS — Z12.11 ENCOUNTER FOR SCREENING FOR MALIGNANT NEOPLASM OF COLON: ICD-10-CM

## 2018-02-12 DIAGNOSIS — R74.8 ELEVATED LIVER ENZYMES: ICD-10-CM

## 2018-02-12 PROCEDURE — 25010000002 PROPOFOL 10 MG/ML EMULSION: Performed by: ANESTHESIOLOGY

## 2018-02-12 PROCEDURE — 45385 COLONOSCOPY W/LESION REMOVAL: CPT | Performed by: INTERNAL MEDICINE

## 2018-02-12 PROCEDURE — 45380 COLONOSCOPY AND BIOPSY: CPT | Performed by: INTERNAL MEDICINE

## 2018-02-12 PROCEDURE — 88305 TISSUE EXAM BY PATHOLOGIST: CPT | Performed by: INTERNAL MEDICINE

## 2018-02-12 RX ORDER — PROPOFOL 10 MG/ML
VIAL (ML) INTRAVENOUS AS NEEDED
Status: DISCONTINUED | OUTPATIENT
Start: 2018-02-12 | End: 2018-02-12 | Stop reason: SURG

## 2018-02-12 RX ORDER — SODIUM CHLORIDE, SODIUM LACTATE, POTASSIUM CHLORIDE, CALCIUM CHLORIDE 600; 310; 30; 20 MG/100ML; MG/100ML; MG/100ML; MG/100ML
30 INJECTION, SOLUTION INTRAVENOUS CONTINUOUS
Status: DISCONTINUED | OUTPATIENT
Start: 2018-02-12 | End: 2018-02-12

## 2018-02-12 RX ORDER — SODIUM CHLORIDE, SODIUM LACTATE, POTASSIUM CHLORIDE, CALCIUM CHLORIDE 600; 310; 30; 20 MG/100ML; MG/100ML; MG/100ML; MG/100ML
30 INJECTION, SOLUTION INTRAVENOUS CONTINUOUS PRN
Status: DISCONTINUED | OUTPATIENT
Start: 2018-02-12 | End: 2018-02-12 | Stop reason: HOSPADM

## 2018-02-12 RX ORDER — LIDOCAINE HYDROCHLORIDE 20 MG/ML
INJECTION, SOLUTION INFILTRATION; PERINEURAL AS NEEDED
Status: DISCONTINUED | OUTPATIENT
Start: 2018-02-12 | End: 2018-02-12 | Stop reason: SURG

## 2018-02-12 RX ORDER — SODIUM CHLORIDE, SODIUM LACTATE, POTASSIUM CHLORIDE, CALCIUM CHLORIDE 600; 310; 30; 20 MG/100ML; MG/100ML; MG/100ML; MG/100ML
INJECTION, SOLUTION INTRAVENOUS CONTINUOUS PRN
Status: DISCONTINUED | OUTPATIENT
Start: 2018-02-12 | End: 2018-02-12 | Stop reason: SURG

## 2018-02-12 RX ORDER — PROPOFOL 10 MG/ML
VIAL (ML) INTRAVENOUS CONTINUOUS PRN
Status: DISCONTINUED | OUTPATIENT
Start: 2018-02-12 | End: 2018-02-12 | Stop reason: SURG

## 2018-02-12 RX ADMIN — PROPOFOL 120 MG: 10 INJECTION, EMULSION INTRAVENOUS at 12:08

## 2018-02-12 RX ADMIN — SODIUM CHLORIDE, POTASSIUM CHLORIDE, SODIUM LACTATE AND CALCIUM CHLORIDE: 600; 310; 30; 20 INJECTION, SOLUTION INTRAVENOUS at 12:08

## 2018-02-12 RX ADMIN — PROPOFOL 180 MCG/KG/MIN: 10 INJECTION, EMULSION INTRAVENOUS at 12:08

## 2018-02-12 RX ADMIN — SODIUM CHLORIDE, POTASSIUM CHLORIDE, SODIUM LACTATE AND CALCIUM CHLORIDE 30 ML/HR: 600; 310; 30; 20 INJECTION, SOLUTION INTRAVENOUS at 12:01

## 2018-02-12 RX ADMIN — LIDOCAINE HYDROCHLORIDE 60 MG: 20 INJECTION, SOLUTION INFILTRATION; PERINEURAL at 12:08

## 2018-02-12 NOTE — ANESTHESIA PREPROCEDURE EVALUATION
Anesthesia Evaluation     Patient summary reviewed and Nursing notes reviewed   NPO Solid Status: > 8 hours  NPO Liquid Status: > 2 hours           Airway   Mallampati: II  TM distance: >3 FB  Neck ROM: full  no difficulty expected  Dental - normal exam     Pulmonary - negative pulmonary ROS and normal exam    breath sounds clear to auscultation  (-) shortness of breath, sleep apnea, decreased breath sounds, wheezes  Cardiovascular - negative cardio ROS and normal exam  Exercise tolerance: good (4-7 METS)    Rhythm: regular  Rate: normal    (-) CAD, angina, CHF, orthopnea, PND, ORTEGA, PVD      Neuro/Psych- negative ROS  (-) seizures, neuromuscular disease, TIA, CVA, headaches, syncope, weakness, numbness  GI/Hepatic/Renal/Endo - negative ROS   (-) liver disease, diabetes    Musculoskeletal (-) negative ROS    Abdominal  - normal exam   Substance History - negative use  (-) alcohol use, drug use     OB/GYN negative ob/gyn ROS         Other - negative ROS                     Anesthesia Plan    ASA 2     MAC     intravenous induction   Anesthetic plan and risks discussed with patient.    Plan discussed with CRNA.

## 2018-02-12 NOTE — ANESTHESIA POSTPROCEDURE EVALUATION
"Patient: Glenis Bernardo    Procedure Summary     Date Anesthesia Start Anesthesia Stop Room / Location    02/12/18 1207 1238  [facility] /  [facility]       Procedure Diagnosis Surgeon Provider    COLONOSCOPY TO CECUM WITH POLYPECTOMY HOT SNARE, COLD BIOPSY, COLD SNARE (N/A ) Elevated liver enzymes; Encounter for screening for malignant neoplasm of colon  (Elevated liver enzymes [R74.8]; Encounter for screening for malignant neoplasm of colon [Z12.11]) MD Waylon Boone MD          Anesthesia Type: MAC  Last vitals  BP   127/78 (02/12/18 1200)   Temp   36.6 °C (97.8 °F) (02/12/18 1200)   Pulse   110 (02/12/18 1200)   Resp   14 (02/12/18 1200)     SpO2   98 % (02/12/18 1200)     Post Anesthesia Care and Evaluation    Patient location during evaluation: bedside  Patient participation: complete - patient participated  Level of consciousness: awake and alert  Pain management: adequate  Airway patency: patent  Anesthetic complications: No anesthetic complications    Cardiovascular status: acceptable  Respiratory status: acceptable  Hydration status: acceptable    Comments: BP 90/56 (BP Location: Left arm, Patient Position: Lying)  Pulse 85  Temp 36.6 °C (97.8 °F)  Resp 16  Ht 157.5 cm (62\")  Wt 67.8 kg (149 lb 6 oz)  SpO2 96%  BMI 27.32 kg/m2      "

## 2018-02-12 NOTE — PLAN OF CARE
Problem: Patient Care Overview (Adult)  Goal: Plan of Care Review  Outcome: Ongoing (interventions implemented as appropriate)   02/12/18 1152   Coping/Psychosocial Response Interventions   Plan Of Care Reviewed With patient     Goal: Adult Individualization and Mutuality  Outcome: Ongoing (interventions implemented as appropriate)   02/12/18 1152   Individualization   Patient Specific Preferences none     Goal: Discharge Needs Assessment  Outcome: Ongoing (interventions implemented as appropriate)   02/12/18 1152   Discharge Needs Assessment   Concerns To Be Addressed no discharge needs identified   Readmission Within The Last 30 Days no previous admission in last 30 days   Equipment Needed After Discharge none   Current Health   Anticipated Changes Related to Illness none   Living Environment   Transportation Available car;family or friend will provide       Problem: GI Endoscopy (Adult)  Goal: Signs and Symptoms of Listed Potential Problems Will be Absent or Manageable (GI Endoscopy)  Outcome: Ongoing (interventions implemented as appropriate)   02/12/18 1152   GI Endoscopy   Problems Assessed (GI Endoscopy) all   Problems Present (GI Endoscopy) none

## 2018-02-13 LAB
CYTO UR: NORMAL
LAB AP CASE REPORT: NORMAL
Lab: NORMAL
PATH REPORT.FINAL DX SPEC: NORMAL
PATH REPORT.GROSS SPEC: NORMAL

## 2018-02-13 NOTE — PROGRESS NOTES
The polyp(s) biopsies showed adenomatous change. This is not cancerous but is considered potentially precancerous. Follow-up colonoscopy in 3 years is advised.

## 2018-02-28 ENCOUNTER — TELEPHONE (OUTPATIENT)
Dept: GASTROENTEROLOGY | Facility: CLINIC | Age: 58
End: 2018-02-28

## 2018-02-28 NOTE — TELEPHONE ENCOUNTER
Pt returned call per a staff message.    Called back and spoke with pt's daughter, Elizabeth, (ok per LIANNA). Advised that per Dr Espinosa: the polyps that were removed during the colonoscopy were not cancerous but were considered potentially precancerous. Advised that MD recommends to repeat the c/s in 3 years. Pt's daughter verb understanding.

## 2018-02-28 NOTE — TELEPHONE ENCOUNTER
Called pt and left a message for call back.    Health Maintenance updated to reflect c/s due 2/2021.

## 2018-02-28 NOTE — TELEPHONE ENCOUNTER
----- Message from Juan Espinosa MD sent at 2/13/2018 11:53 AM EST -----  The polyp(s) biopsies showed adenomatous change. This is not cancerous but is considered potentially precancerous. Follow-up colonoscopy in 3 years is advised.

## 2019-08-21 ENCOUNTER — HOSPITAL ENCOUNTER (EMERGENCY)
Facility: HOSPITAL | Age: 59
Discharge: HOME | End: 2019-08-21
Attending: EMERGENCY MEDICINE | Admitting: EMERGENCY MEDICINE

## 2019-08-21 ENCOUNTER — APPOINTMENT (OUTPATIENT)
Dept: GENERAL RADIOLOGY | Facility: HOSPITAL | Age: 59
End: 2019-08-21

## 2019-08-21 VITALS
DIASTOLIC BLOOD PRESSURE: 95 MMHG | RESPIRATION RATE: 18 BRPM | HEIGHT: 62 IN | BODY MASS INDEX: 32.39 KG/M2 | TEMPERATURE: 98.7 F | HEART RATE: 99 BPM | SYSTOLIC BLOOD PRESSURE: 140 MMHG | OXYGEN SATURATION: 97 % | WEIGHT: 176 LBS

## 2019-08-21 DIAGNOSIS — R05.3 CHRONIC COUGH: ICD-10-CM

## 2019-08-21 DIAGNOSIS — S22.31XA CLOSED FRACTURE OF ONE RIB OF RIGHT SIDE, INITIAL ENCOUNTER: Primary | ICD-10-CM

## 2019-08-21 PROCEDURE — 99283 EMERGENCY DEPT VISIT LOW MDM: CPT

## 2019-08-21 PROCEDURE — 71101 X-RAY EXAM UNILAT RIBS/CHEST: CPT

## 2019-08-21 RX ORDER — DEXTROMETHORPHAN HYDROBROMIDE AND PROMETHAZINE HYDROCHLORIDE 15; 6.25 MG/5ML; MG/5ML
2.5 SYRUP ORAL 4 TIMES DAILY PRN
Qty: 60 ML | Refills: 0 | Status: SHIPPED | OUTPATIENT
Start: 2019-08-21 | End: 2021-07-02

## 2019-08-21 NOTE — ED PROVIDER NOTES
" EMERGENCY DEPARTMENT ENCOUNTER    CHIEF COMPLAINT  Chief Complaint: chest wall pain  History given by: patient  History limited by: none  Room Number: 12/12  PMD: Shahazd Romero MD      HPI:  Pt is a 58 y.o. female who presents complaining of posterior chest wall pain after sneezing and hearing something \"pop\" last night. She admits she had lateral anterior chest wall pain before the current pain as well as nasal congestion and cough. Admits to mild worsening of pain w/ movement. She denies any other pain. Denies any injury or trauma. Denies CP, SOA, leg pain swelling, abd pain, n/v/d, and other complaints. Pt took tylenol last night after start of sx and this morning w/ some improvement.      PAST MEDICAL HISTORY  Active Ambulatory Problems     Diagnosis Date Noted   • Elevated liver enzymes 01/09/2018   • Encounter for screening for malignant neoplasm of colon 01/09/2018     Resolved Ambulatory Problems     Diagnosis Date Noted   • No Resolved Ambulatory Problems     Past Medical History:   Diagnosis Date   • Anxiety    • Choroid melanoma of left eye (CMS/HCC)        PAST SURGICAL HISTORY  Past Surgical History:   Procedure Laterality Date   • BREAST BIOPSY Left    • COLONOSCOPY N/A 2/12/2018    Procedure: COLONOSCOPY TO CECUM WITH POLYPECTOMY HOT SNARE, COLD BIOPSY, COLD SNARE;  Surgeon: Juan Espinosa MD;  Location: [facility] ENDOSCOPY;  Service:    • EYE SURGERY         FAMILY HISTORY  Family History   Problem Relation Age of Onset   • Heart failure Mother    • Heart failure Father    • Diabetes Sister    • Diabetes Brother        SOCIAL HISTORY  Social History     Socioeconomic History   • Marital status:      Spouse name: Not on file   • Number of children: Not on file   • Years of education: Not on file   • Highest education level: Not on file   Tobacco Use   • Smoking status: Current Every Day Smoker     Types: Electronic Cigarette   • Smokeless tobacco: Never Used   • Tobacco comment: e-cig "   Substance and Sexual Activity   • Alcohol use: Yes     Alcohol/week: 8.4 oz     Types: 14 Shots of liquor per week     Comment: Pt reports drinking beer every night.    • Drug use: No       ALLERGIES  Patient has no known allergies.    REVIEW OF SYSTEMS  Review of Systems   Constitutional: Negative for fever.   HENT: Negative for sore throat.    Eyes: Negative.    Respiratory: Negative for cough and shortness of breath.    Cardiovascular: Negative for chest pain.   Gastrointestinal: Negative for abdominal pain, diarrhea and vomiting.   Genitourinary: Negative for dysuria.   Musculoskeletal: Negative for neck pain.        + Posterior chest wall pain    Skin: Negative for rash.   Allergic/Immunologic: Negative.    Neurological: Negative for weakness, numbness and headaches.   Hematological: Negative.    Psychiatric/Behavioral: Negative.    All other systems reviewed and are negative.      PHYSICAL EXAM  ED Triage Vitals [08/21/19 0941]   Temp Heart Rate Resp BP SpO2   98.7 °F (37.1 °C) (!) 123 18 -- 95 %      Temp src Heart Rate Source Patient Position BP Location FiO2 (%)   Tympanic Monitor -- -- --       Physical Exam   Constitutional: She is oriented to person, place, and time and well-developed, well-nourished, and in no distress. No distress.   HENT:   Head: Normocephalic.   Mouth/Throat: Mucous membranes are normal.   Eyes: EOM are normal.   Neck: Normal range of motion.   Cardiovascular: Normal rate and regular rhythm. Exam reveals no gallop and no friction rub.   No murmur heard.  Pulmonary/Chest: Effort normal. No respiratory distress. She has no wheezes. She has no rhonchi. She has no rales.   Abdominal: Soft. She exhibits no distension. There is no tenderness. There is no guarding.   Musculoskeletal: Normal range of motion. She exhibits no deformity.   Neurological: She is alert and oriented to person, place, and time. GCS score is 15.   moving all extremities, no focal deficits   Skin: Skin is warm and  dry.   Psychiatric:   Calm, cooperative   Nursing note and vitals reviewed.      RADIOLOGY  XR Ribs Right With PA Chest   The lungs are well-expanded and clear. There is a small hiatus  hernia unchanged from 11/25/2017. The heart size is normal.     Multiple views of the right ribs are suspicious for a minimal fracture  at the costochondral junction of the right 8th rib.           I ordered the above noted radiological studies. Interpreted by radiologist. Reviewed by me in PACS.       PROCEDURES  Procedures      PROGRESS AND CONSULTS     1143  BP- 137/87 HR- 110 Temp- 98.7 °F (37.1 °C) (Tympanic) O2 sat- 95%  Rechecked the patient who is in NAD and is resting comfortably. Discussed imaging showing possible fx of the 8th rib. Questioned pt about what medicines she's been taking for sx. She admits she's not been taking anything OTC. Pt told the plan to d/c w/ her to take OTC pain medications and sx treatment of cough. Pt instructed to f/u w/ her PCP. Pt given return to ED instructions. Pt understands and agrees with the plan, all questions answered.    MEDICAL DECISION MAKING  Results were reviewed/discussed with the patient and they were also made aware of online access. Pt also made aware that some labs, such as cultures, will not be resulted during ER visit and follow up with PMD is necessary.     MDM  Number of Diagnoses or Management Options  Chronic cough:   Closed fracture of one rib of right side, initial encounter:   Diagnosis management comments: Patient presents today for pain in her right chest wall after noticing a pop after coughing.  Has some discomfort, but no significant difficulty breathing, no recent falls or injuries.  X-ray today does not show any pneumonia, but does show a possible right eighth rib fracture.  Patient given cough syrup with instructions to use Tylenol and ibuprofen for pain as needed.  Advised continuing any current medications.  Advised PCP follow-up and return to the emergency  room for worsening symptoms as needed.       Amount and/or Complexity of Data Reviewed  Tests in the radiology section of CPT®: reviewed  Independent visualization of images, tracings, or specimens: yes    Patient Progress  Patient progress: stable         DIAGNOSIS  Final diagnoses:   Closed fracture of one rib of right side, initial encounter   Chronic cough       DISPOSITION  DISCHARGE    Patient discharged in stable condition.    Reviewed implications of results, diagnosis, meds, responsibility to follow up, warning signs and symptoms of possible worsening, potential complications and reasons to return to ER.    Patient/Family voiced understanding of above instructions.    Discussed plan for discharge, as there is no emergent indication for admission. Patient referred to primary care provider for BP management due to today's BP. Pt/family is agreeable and understands need for follow up and repeat testing.  Pt is aware that discharge does not mean that nothing is wrong but it indicates no emergency is present that requires admission and they must continue care with follow-up as given below or physician of their choice.     FOLLOW-UP  [facility]  [address] [facility] [address] [phone]    As needed, If symptoms worsen    Shahzad Romero MD  [facility] [id number] [phone]    Schedule an appointment as soon as possible for a visit            Medication List      No changes were made to your prescriptions during this visit.       Latest Documented Vital Signs:  As of 11:46 AM  BP- 137/87 HR- 110 Temp- 98.7 °F (37.1 °C) (Tympanic) O2 sat- 95%    --  Documentation assistance provided by leonard Rowley for Dr. Harris.  Information recorded by the scribe was done at my direction and has been verified and validated by me.     Mary Rowley  08/21/19 8912       Wilfredo Harris MD  08/21/19 8512

## 2019-09-04 ENCOUNTER — TELEPHONE (OUTPATIENT)
Dept: FAMILY MEDICINE CLINIC | Facility: CLINIC | Age: 59
End: 2019-09-04

## 2019-09-04 ENCOUNTER — OFFICE VISIT (OUTPATIENT)
Dept: FAMILY MEDICINE CLINIC | Facility: CLINIC | Age: 59
End: 2019-09-04

## 2019-09-04 VITALS
SYSTOLIC BLOOD PRESSURE: 140 MMHG | HEART RATE: 129 BPM | HEIGHT: 62 IN | WEIGHT: 173 LBS | OXYGEN SATURATION: 98 % | BODY MASS INDEX: 31.83 KG/M2 | DIASTOLIC BLOOD PRESSURE: 86 MMHG | TEMPERATURE: 98.9 F

## 2019-09-04 DIAGNOSIS — S22.39XD CLOSED FRACTURE OF ONE RIB WITH ROUTINE HEALING, UNSPECIFIED LATERALITY, SUBSEQUENT ENCOUNTER: ICD-10-CM

## 2019-09-04 DIAGNOSIS — F41.9 ANXIETY: Primary | ICD-10-CM

## 2019-09-04 DIAGNOSIS — Z78.0 POST-MENOPAUSAL: ICD-10-CM

## 2019-09-04 PROCEDURE — 99213 OFFICE O/P EST LOW 20 MIN: CPT | Performed by: FAMILY MEDICINE

## 2019-09-04 RX ORDER — LORAZEPAM 1 MG/1
1 TABLET ORAL EVERY 8 HOURS PRN
Qty: 20 TABLET | Refills: 0 | Status: SHIPPED | OUTPATIENT
Start: 2019-09-04 | End: 2021-04-14 | Stop reason: SDUPTHER

## 2019-09-04 RX ORDER — PREDNISOLONE ACETATE 10 MG/ML
1 SUSPENSION/ DROPS OPHTHALMIC DAILY
COMMUNITY
End: 2021-05-18

## 2019-09-04 RX ORDER — LORAZEPAM 0.5 MG/1
0.5 TABLET ORAL EVERY 8 HOURS PRN
Qty: 20 TABLET | Refills: 0 | Status: SHIPPED | OUTPATIENT
Start: 2019-09-04 | End: 2020-03-25 | Stop reason: SDUPTHER

## 2019-09-04 RX ORDER — BUPROPION HYDROCHLORIDE 150 MG/1
TABLET, EXTENDED RELEASE ORAL
Qty: 180 TABLET | Refills: 3 | Status: SHIPPED | OUTPATIENT
Start: 2019-09-04 | End: 2020-09-11 | Stop reason: SDUPTHER

## 2019-09-04 NOTE — PROGRESS NOTES
SUBJECTIVE:  The patient is a 59-year-old white female is here for follow-up.  She sustained a fractured rib on August 21 and is here for follow-up.  See ER note for specific details.  He is feeling better but is complaining of anxiety.  She has a lot of things going on at home.  No chest pain or shortness of breath.  The patient states she always gets anxious coming to the doctor.    PAST MEDICAL HISTORY:  Reviewed.    REVIEW OF SYSTEMS:  Please see above; all others reviewed and are negative.      OBJECTIVE:   Vitals signs are reviewed.  Initial pulse 129.  Patient states anxious when visits physicians.  Repeat pulse 104  General:  Well-nourished.  Alert and oriented x3 in no acute distress.  HEENT: PERRLA.   Neck:  Supple.   Lungs:  Clear.    Heart:  Regular rate and rhythm.   Abdomen:   Soft, nontender.   Extremities:  No cyanosis, clubbing or edema.   Neurological:  Grossly intact without motor or sensory deficits.     ASSESSMENT:    Rib fracture  Anxiety  PLAN: Continue routine care of rib pain.  Wellbutrin 150 twice daily.  Ativan 1 mg every 12 hours as needed panic/anxiety.  Follow-up in 2 to 3 weeks to let me know how you are doing.  Call if problems.  DEXA scan is ordered.

## 2019-09-04 NOTE — ADDENDUM NOTE
Addended by: ALEJANDRA LOZADA on: 9/4/2019 05:07 PM     Modules accepted: Orders    
06-Sep-2018 14:12

## 2019-09-16 ENCOUNTER — HOSPITAL ENCOUNTER (OUTPATIENT)
Dept: BONE DENSITY | Facility: HOSPITAL | Age: 59
Discharge: HOME | End: 2019-09-16
Admitting: FAMILY MEDICINE

## 2019-09-16 DIAGNOSIS — S22.39XD CLOSED FRACTURE OF ONE RIB WITH ROUTINE HEALING, UNSPECIFIED LATERALITY, SUBSEQUENT ENCOUNTER: ICD-10-CM

## 2019-09-16 DIAGNOSIS — Z78.0 POST-MENOPAUSAL: ICD-10-CM

## 2019-09-16 PROCEDURE — 77080 DXA BONE DENSITY AXIAL: CPT

## 2019-09-17 RX ORDER — ALENDRONATE SODIUM 70 MG/1
70 TABLET ORAL
Qty: 4 TABLET | Refills: 11 | Status: SHIPPED | OUTPATIENT
Start: 2019-09-17 | End: 2021-08-20

## 2020-03-25 ENCOUNTER — TELEPHONE (OUTPATIENT)
Dept: FAMILY MEDICINE CLINIC | Facility: CLINIC | Age: 60
End: 2020-03-25

## 2020-03-25 RX ORDER — LORAZEPAM 0.5 MG/1
0.5 TABLET ORAL EVERY 8 HOURS PRN
Qty: 30 TABLET | Refills: 0 | Status: SHIPPED | OUTPATIENT
Start: 2020-03-25 | End: 2020-05-12 | Stop reason: SDUPTHER

## 2020-03-25 NOTE — TELEPHONE ENCOUNTER
PATIENT CALLED IN AND STATED SHE IS HAVING PRETTY BAD ANXIETY AND WANTED A PRESCRIPTION CALLED IN . PHARMACY VERIFIED AS [facility]  [address] [place] [address] [phone]. PATIENT CALL BACK [phone].

## 2020-05-12 RX ORDER — LORAZEPAM 0.5 MG/1
0.5 TABLET ORAL EVERY 8 HOURS PRN
Qty: 30 TABLET | Refills: 0 | Status: SHIPPED | OUTPATIENT
Start: 2020-05-12 | End: 2021-04-14 | Stop reason: DRUGHIGH

## 2020-09-11 RX ORDER — LORAZEPAM 0.5 MG/1
TABLET ORAL
Qty: 30 TABLET | Refills: 0 | OUTPATIENT
Start: 2020-09-11

## 2020-09-11 RX ORDER — BUPROPION HYDROCHLORIDE 150 MG/1
TABLET, EXTENDED RELEASE ORAL
Qty: 60 TABLET | Refills: 0 | Status: SHIPPED | OUTPATIENT
Start: 2020-09-11 | End: 2021-07-02

## 2020-09-11 RX ORDER — BUPROPION HYDROCHLORIDE 150 MG/1
TABLET, EXTENDED RELEASE ORAL
Qty: 180 TABLET | Refills: 2 | OUTPATIENT
Start: 2020-09-11

## 2021-04-14 ENCOUNTER — OFFICE VISIT (OUTPATIENT)
Dept: FAMILY MEDICINE CLINIC | Facility: CLINIC | Age: 61
End: 2021-04-14

## 2021-04-14 VITALS
DIASTOLIC BLOOD PRESSURE: 80 MMHG | TEMPERATURE: 97.8 F | HEART RATE: 130 BPM | BODY MASS INDEX: 29.52 KG/M2 | SYSTOLIC BLOOD PRESSURE: 140 MMHG | OXYGEN SATURATION: 98 % | HEIGHT: 62 IN | WEIGHT: 160.4 LBS

## 2021-04-14 DIAGNOSIS — R79.89 ELEVATED LFTS: Primary | ICD-10-CM

## 2021-04-14 DIAGNOSIS — R00.0 SINUS TACHYCARDIA: ICD-10-CM

## 2021-04-14 DIAGNOSIS — R73.9 ELEVATED BLOOD SUGAR: ICD-10-CM

## 2021-04-14 DIAGNOSIS — Z00.00 PHYSICAL EXAM, ANNUAL: Primary | ICD-10-CM

## 2021-04-14 DIAGNOSIS — F41.9 ANXIETY: ICD-10-CM

## 2021-04-14 LAB
25(OH)D3 SERPL-MCNC: 67.1 NG/ML (ref 30–100)
ALBUMIN SERPL-MCNC: 4.4 G/DL (ref 3.5–5.2)
ALBUMIN/GLOB SERPL: 1.7 G/DL
ALP SERPL-CCNC: 87 U/L (ref 39–117)
ALT SERPL W P-5'-P-CCNC: 52 U/L (ref 1–33)
ANION GAP SERPL CALCULATED.3IONS-SCNC: 17 MMOL/L (ref 5–15)
AST SERPL-CCNC: 50 U/L (ref 1–32)
BILIRUB SERPL-MCNC: 0.7 MG/DL (ref 0–1.2)
BUN SERPL-MCNC: 11 MG/DL (ref 6–20)
BUN/CREAT SERPL: 14.9 (ref 7–25)
CALCIUM SPEC-SCNC: 9.5 MG/DL (ref 8.6–10.5)
CHLORIDE SERPL-SCNC: 98 MMOL/L (ref 98–107)
CHOLEST SERPL-MCNC: 251 MG/DL (ref 0–200)
CO2 SERPL-SCNC: 22 MMOL/L (ref 22–29)
CREAT SERPL-MCNC: 0.74 MG/DL (ref 0.57–1)
DEPRECATED RDW RBC AUTO: 43.3 FL (ref 37–54)
ERYTHROCYTE [DISTWIDTH] IN BLOOD BY AUTOMATED COUNT: 11.9 % (ref 12.3–15.4)
GFR SERPL CREATININE-BSD FRML MDRD: 81 ML/MIN/1.73
GLOBULIN UR ELPH-MCNC: 2.6 GM/DL
GLUCOSE SERPL-MCNC: 179 MG/DL (ref 65–99)
HCT VFR BLD AUTO: 41.8 % (ref 34–46.6)
HDLC SERPL-MCNC: 70 MG/DL (ref 40–60)
HGB BLD-MCNC: 15.1 G/DL (ref 12–15.9)
LDLC SERPL CALC-MCNC: 165 MG/DL (ref 0–100)
LDLC/HDLC SERPL: 2.32 {RATIO}
MCH RBC QN AUTO: 35.7 PG (ref 26.6–33)
MCHC RBC AUTO-ENTMCNC: 36.1 G/DL (ref 31.5–35.7)
MCV RBC AUTO: 98.8 FL (ref 79–97)
PLATELET # BLD AUTO: 316 10*3/MM3 (ref 140–450)
PMV BLD AUTO: 10.3 FL (ref 6–12)
POTASSIUM SERPL-SCNC: 3.9 MMOL/L (ref 3.5–5.2)
PROT SERPL-MCNC: 7 G/DL (ref 6–8.5)
RBC # BLD AUTO: 4.23 10*6/MM3 (ref 3.77–5.28)
SODIUM SERPL-SCNC: 137 MMOL/L (ref 136–145)
T-UPTAKE NFR SERPL: 1.06 TBI (ref 0.8–1.3)
T4 SERPL-MCNC: 6.95 MCG/DL (ref 4.5–11.7)
TRIGL SERPL-MCNC: 94 MG/DL (ref 0–150)
TSH SERPL DL<=0.05 MIU/L-ACNC: 1.93 UIU/ML (ref 0.27–4.2)
VLDLC SERPL-MCNC: 16 MG/DL (ref 5–40)
WBC # BLD AUTO: 12.18 10*3/MM3 (ref 3.4–10.8)

## 2021-04-14 PROCEDURE — 99386 PREV VISIT NEW AGE 40-64: CPT | Performed by: INTERNAL MEDICINE

## 2021-04-14 PROCEDURE — 93000 ELECTROCARDIOGRAM COMPLETE: CPT | Performed by: INTERNAL MEDICINE

## 2021-04-14 PROCEDURE — 36415 COLL VENOUS BLD VENIPUNCTURE: CPT | Performed by: INTERNAL MEDICINE

## 2021-04-14 PROCEDURE — 80061 LIPID PANEL: CPT | Performed by: INTERNAL MEDICINE

## 2021-04-14 PROCEDURE — 82306 VITAMIN D 25 HYDROXY: CPT | Performed by: INTERNAL MEDICINE

## 2021-04-14 PROCEDURE — 84436 ASSAY OF TOTAL THYROXINE: CPT | Performed by: INTERNAL MEDICINE

## 2021-04-14 PROCEDURE — 84443 ASSAY THYROID STIM HORMONE: CPT | Performed by: INTERNAL MEDICINE

## 2021-04-14 PROCEDURE — 85027 COMPLETE CBC AUTOMATED: CPT | Performed by: INTERNAL MEDICINE

## 2021-04-14 PROCEDURE — 84479 ASSAY OF THYROID (T3 OR T4): CPT | Performed by: INTERNAL MEDICINE

## 2021-04-14 PROCEDURE — 80053 COMPREHEN METABOLIC PANEL: CPT | Performed by: INTERNAL MEDICINE

## 2021-04-14 RX ORDER — LORAZEPAM 1 MG/1
TABLET ORAL
Qty: 60 TABLET | Refills: 4 | Status: SHIPPED | OUTPATIENT
Start: 2021-04-14 | End: 2021-08-20 | Stop reason: SDUPTHER

## 2021-04-14 NOTE — PROGRESS NOTES
Subjective   Glenis Bernardo is a 60 y.o. female.     Vitals:    04/14/21 1430   BP: 140/80   Pulse: (Abnormal) 130   Temp: 97.8 °F (36.6 °C)   SpO2: 98%      Body mass index is 29.34 kg/m².     History of Present Illness   Patient was seen for a physical.  Patient's diet and physical activity were discussed at this visit.  Patient did have a rapid heart rate and EKG showed a sinus tachycardia.  Patient was beating 120 beats a minute.  Patient's cardiologist was called and we will follow-up tomorrow at 4 PM in her office.    Dictated utilizing Dragon dictation. If there are questions or for further clarification, please contact me.  The following portions of the patient's history were reviewed and updated as appropriate: allergies, current medications, past family history, past medical history, past social history, past surgical history and problem list.    Review of Systems   Constitutional: Negative for fatigue and fever.   HENT: Positive for congestion. Negative for trouble swallowing.    Eyes: Negative for discharge and visual disturbance.   Respiratory: Negative for choking and shortness of breath.    Cardiovascular: Positive for palpitations. Negative for chest pain.   Gastrointestinal: Negative for abdominal pain and blood in stool.   Endocrine: Negative.    Genitourinary: Negative for genital sores and hematuria.   Musculoskeletal: Negative for gait problem and joint swelling.   Skin: Negative for color change, pallor, rash and wound.   Allergic/Immunologic: Positive for environmental allergies. Negative for immunocompromised state.   Neurological: Negative for facial asymmetry and speech difficulty.   Psychiatric/Behavioral: Negative for hallucinations and suicidal ideas.       Objective   Physical Exam  Constitutional:       General: She is not in acute distress.     Appearance: Normal appearance. She is well-developed. She is not ill-appearing, toxic-appearing or diaphoretic.   HENT:      Head: Normocephalic  and atraumatic.      Right Ear: Tympanic membrane, ear canal and external ear normal. There is no impacted cerumen.      Left Ear: Tympanic membrane, ear canal and external ear normal. There is no impacted cerumen.      Nose: Nose normal. No congestion or rhinorrhea.      Mouth/Throat:      Mouth: Mucous membranes are dry.      Pharynx: Oropharynx is clear. No oropharyngeal exudate or posterior oropharyngeal erythema.   Eyes:      General: No scleral icterus.        Right eye: No discharge.         Left eye: No discharge.      Extraocular Movements: Extraocular movements intact.      Conjunctiva/sclera: Conjunctivae normal.      Pupils: Pupils are equal, round, and reactive to light.   Neck:      Thyroid: No thyromegaly.      Vascular: No carotid bruit or JVD.      Trachea: No tracheal deviation.   Cardiovascular:      Rate and Rhythm: Regular rhythm. Tachycardia present.      Heart sounds: Normal heart sounds. No murmur heard.   No friction rub. No gallop.    Pulmonary:      Effort: Pulmonary effort is normal. No respiratory distress.      Breath sounds: Normal breath sounds. No stridor. No wheezing, rhonchi or rales.   Chest:      Chest wall: No tenderness.   Abdominal:      General: Bowel sounds are normal. There is no distension.      Palpations: Abdomen is soft. There is no mass.      Tenderness: There is no abdominal tenderness. There is no right CVA tenderness, left CVA tenderness, guarding or rebound.      Hernia: No hernia is present.   Musculoskeletal:         General: No swelling, tenderness, deformity or signs of injury. Normal range of motion.      Cervical back: No rigidity. No muscular tenderness.      Right lower leg: No edema.      Left lower leg: No edema.   Lymphadenopathy:      Cervical: No cervical adenopathy.   Skin:     General: Skin is warm and dry.      Coloration: Skin is not jaundiced or pale.      Findings: No bruising, erythema, lesion or rash.   Neurological:      General: No focal  deficit present.      Mental Status: She is alert and oriented to person, place, and time.      Cranial Nerves: No cranial nerve deficit.      Sensory: No sensory deficit.      Motor: No weakness or abnormal muscle tone.      Coordination: Coordination normal.      Gait: Gait normal.      Deep Tendon Reflexes: Reflexes normal.   Psychiatric:         Mood and Affect: Mood normal.         Behavior: Behavior normal.         Thought Content: Thought content normal.         Judgment: Judgment normal.         Assessment/Plan #1 physical 2 labs #3 cardiology follow-up  Problems Addressed this Visit     Mental Health            Anxiety    Relevant Medications    LORazepam (Ativan) 1 MG tablet    Other Relevant Orders    CBC (No Diff) (Completed)    Comprehensive Metabolic Panel (Completed)    Lipid Panel (Completed)    Thyroid Panel With TSH (Completed)    Vitamin D 25 hydroxy (Completed)            Other Visit Diagnoses     Physical exam, annual    -  Primary    Relevant Orders    Comprehensive Metabolic Panel (Completed)    Lipid Panel (Completed)    Thyroid Panel With TSH (Completed)    Vitamin D 25 hydroxy (Completed)    Sinus tachycardia          Diagnoses     Diagnosis Codes Comments    Physical exam, annual    -  Primary ICD-10-CM: Z00.00  ICD-9-CM: V70.0     Anxiety     ICD-10-CM: F41.9  ICD-9-CM: 300.00     Sinus tachycardia     ICD-10-CM: R00.0  ICD-9-CM: 427.89

## 2021-04-14 NOTE — PROGRESS NOTES
ECG 12 Lead    Date/Time: 4/14/2021 7:40 PM  Performed by: Skyler Velasquez MD  Authorized by: Skyler Velasquez MD   Comparison: not compared with previous ECG   Rhythm: sinus tachycardia  Rate: tachycardic  Conduction: conduction normal  ST Segments: ST segments normal  T Waves: T waves normal  QRS axis: right  Other: no other findings    Clinical impression: abnormal EKG  Comments: EKG Interpretation Report    Heart rate:    120 beats/min, SD interval:  144 msec, QRS duration:  90 msec  QTu:313 msec, QTc:  384 msec

## 2021-04-15 ENCOUNTER — OFFICE VISIT (OUTPATIENT)
Dept: CARDIOLOGY | Facility: CLINIC | Age: 61
End: 2021-04-15

## 2021-04-15 VITALS
DIASTOLIC BLOOD PRESSURE: 80 MMHG | BODY MASS INDEX: 29.81 KG/M2 | OXYGEN SATURATION: 97 % | HEART RATE: 102 BPM | SYSTOLIC BLOOD PRESSURE: 116 MMHG | HEIGHT: 62 IN | WEIGHT: 162 LBS

## 2021-04-15 DIAGNOSIS — R00.0 SINUS TACHYCARDIA: Primary | ICD-10-CM

## 2021-04-15 PROCEDURE — 99204 OFFICE O/P NEW MOD 45 MIN: CPT | Performed by: INTERNAL MEDICINE

## 2021-04-15 NOTE — PROGRESS NOTES
Subjective:     Encounter Date:04/15/2021      Patient ID: Glenis Bernardo is a 60 y.o. female.    Chief Complaint:  History of Present Illness    This is a 60-year-old with a history of anxiety, choroid melanoma of the left eye, who presents for evaluation of tachycardia.    The patient was seen by Dr. Velasquez on 4/14/2015 for routine physical.  She was noted to be tachycardic at the time of her evaluation.  An EKG was performed with the rhythm strips that showed sinus tachycardia with rates in the 120s to 130.  She admitted to increased anxiety at that office visit.  She was given a prescription for lorazepam and referred here for for urgent evaluation.    The patient indicates that she has noted heart racing for the last 5 to 6 days.  On review of her prior records it appears that she saw Dr. Romero in 9/2019 for follow-up of a rib fracture.  At that time it was noted that her heart initial heart rate was 129 and on recheck it was 104.  The patient admitted to feeling anxious at that time.  When I asked the patient about that office visit she reports that she did not really feel like she had any symptoms with elevated heart rates but she has been feeling palpitations for the last few days.  She also notes some shortness of breath.  She denies any chest pain.  She has had some lightheadedness but no near-syncope or syncope.  She denies any lower extremity edema, orthopnea.  She reports that she took the lorazepam yesterday and slept for the first time in a long time.  She reports overall she is feeling much better today although she continues to have some mild awareness of elevated heart rates.  She denies any prior cardiac history or work-up.  She does admit to increased stress due to to her employment situation and her family.  In addition she admits that she gets stressed out watching the news and has had to stop watching that.  She does admit to drinking at least 1 beer daily.    Review of Systems    Constitutional: Positive for malaise/fatigue.   HENT: Negative for hearing loss, hoarse voice, nosebleeds and sore throat.    Eyes: Negative for pain.   Cardiovascular: Positive for dyspnea on exertion and palpitations. Negative for chest pain, claudication, cyanosis, irregular heartbeat, leg swelling, near-syncope, orthopnea, paroxysmal nocturnal dyspnea and syncope.   Respiratory: Negative for shortness of breath and snoring.    Endocrine: Negative for cold intolerance, heat intolerance, polydipsia, polyphagia and polyuria.   Skin: Negative for itching and rash.   Musculoskeletal: Negative for arthritis, falls, joint pain, joint swelling, muscle cramps, muscle weakness and myalgias.   Gastrointestinal: Negative for constipation, diarrhea, dysphagia, heartburn, hematemesis, hematochezia, melena, nausea and vomiting.   Genitourinary: Negative for frequency, hematuria and hesitancy.   Neurological: Positive for light-headedness. Negative for excessive daytime sleepiness, dizziness, headaches, numbness and weakness.   Psychiatric/Behavioral: Negative for depression. The patient is nervous/anxious.          Current Outpatient Medications:   •  alendronate (FOSAMAX) 70 MG tablet, Take 1 tablet by mouth Every 7 (Seven) Days., Disp: 4 tablet, Rfl: 11  •  buPROPion SR (Wellbutrin SR) 150 MG 12 hr tablet, 1 p.o. daily x1 week then twice daily thereafter, Disp: 60 tablet, Rfl: 0  •  Calcium Carbonate Antacid (ANTACID E-X PO), Take  by mouth., Disp: , Rfl:   •  diphenhydrAMINE-acetaminophen (TYLENOL PM)  MG tablet per tablet, Take 1 tablet by mouth At Night As Needed for Sleep., Disp: , Rfl:   •  LORazepam (Ativan) 1 MG tablet, 1 po bid prn anxiety, Disp: 60 tablet, Rfl: 4  •  nystatin (MYCOSTATIN) 012198 UNIT/ML suspension, Take 5 mL by mouth 4 (Four) Times a Day. 5 mL PO QID x 5 days, retain in mouth as long as possible, use 48 hours after sx resolve, Disp: 120 mL, Rfl: 0  •  prednisoLONE acetate (PRED FORTE) 1 %  "ophthalmic suspension, Administer 1 drop into the left eye Daily., Disp: , Rfl:   •  promethazine-dextromethorphan (PROMETHAZINE-DM) 6.25-15 MG/5ML syrup, Take 2.5 mL by mouth 4 (Four) Times a Day As Needed for Cough., Disp: 60 mL, Rfl: 0  •  vitamin D3 125 MCG (5000 UT) capsule capsule, Take 5,000 Units by mouth Daily., Disp: , Rfl:     Past Medical History:   Diagnosis Date   • Anxiety    • Choroid melanoma of left eye (CMS/HCC)    • History of radiation therapy        Past Surgical History:   Procedure Laterality Date   • BREAST BIOPSY Left    • COLONOSCOPY N/A 2018    Procedure: COLONOSCOPY TO CECUM WITH POLYPECTOMY HOT SNARE, COLD BIOPSY, COLD SNARE;  Surgeon: Juan Espinosa MD;  Location: [facility] ENDOSCOPY;  Service:    • EYE SURGERY         Family History   Problem Relation Age of Onset   • Heart failure Mother    • Heart failure Father    • Diabetes Sister    • Skin cancer Sister    • Diabetes Brother    • Cancer Sister    • Diabetes Sister    • Prostate cancer Brother    • Cirrhosis Sister    • Parkinsonism Brother        Social History     Tobacco Use   • Smoking status: Former Smoker     Types: Electronic Cigarette, Cigarettes     Quit date:      Years since quittin.2   • Smokeless tobacco: Never Used   Vaping Use   • Vaping Use: Former   Substance Use Topics   • Alcohol use: Yes     Alcohol/week: 14.0 standard drinks     Types: 14 Shots of liquor per week     Comment: Pt reports drinking beer every night.    • Drug use: No       Procedures       Objective:     Visit Vitals  /80 (BP Location: Right arm, Patient Position: Sitting, Cuff Size: Adult)   Pulse 102   Ht 157.5 cm (62\")   Wt 73.5 kg (162 lb)   SpO2 97%   BMI 29.63 kg/m²         Constitutional:       Appearance: Normal appearance. Well-developed.   Eyes:      General: Lids are normal.      Conjunctiva/sclera: Conjunctivae normal.      Pupils: Pupils are equal, round, and reactive to light.   HENT:      Head: Normocephalic " and atraumatic.   Neck:      Vascular: No carotid bruit or JVD.      Lymphadenopathy: No cervical adenopathy.   Pulmonary:      Effort: Pulmonary effort is normal.      Breath sounds: Normal breath sounds.   Cardiovascular:      Tachycardia present. Regular rhythm.      No gallop.   Pulses:     Radial: 2+ bilaterally.  Edema:     Peripheral edema absent.   Abdominal:      Palpations: Abdomen is soft.   Musculoskeletal:      Cervical back: Full passive range of motion without pain, normal range of motion and neck supple. Skin:     General: Skin is warm and dry.   Neurological:      Mental Status: Alert and oriented to person, place, and time.               Assessment:          Diagnosis Plan   1. Sinus tachycardia            Plan:       1.  Sinus tachycardia.  Heart rates are mildly elevated in the office today.  She does not appear to be terribly symptomatic today.  I suspect her symptoms are largely due to anxiety in light of the fact that her symptoms have improved with treatment of this.  However we will go ahead and proceed with a 24-hour Holter monitor and an echocardiogram to ensure there is no other structural heart or rhythm issues causing her symptoms.  This work-up looks okay then I think we should focus on treatment of her anxiety.  2.  Anxiety.  Improved with lorazepam use.  She will likely need something for chronic treatment of her anxiety.  Will defer this to Dr. Velasquez.    I will call and discuss results of her monitor and echocardiogram determine further recommendations based on those results.

## 2021-04-16 ENCOUNTER — TELEPHONE (OUTPATIENT)
Dept: FAMILY MEDICINE CLINIC | Facility: CLINIC | Age: 61
End: 2021-04-16

## 2021-04-16 NOTE — TELEPHONE ENCOUNTER
Caller: [employer]    Relationship: Emergency Contact    Best call back number: [phone]    Caller requesting test results: THE PATIENTS - ON  [facility]    What test was performed: BLOOD WORK     When was the test performed: 04/14/21     Where was the test performed: [facility]     Additional notes: THE PATIENTS  IS CALLING TO CHECK ON THE RESULTS OF THE PATIENTS BLOOD WORK. PLEASE RETURN CALL AND ADVISE.   THE PATIENTS  STATES THAT HE HAS QUESTIONS FOLLOWING THE RESULTS OF THE BLOOD WORK. THE PATIENTS  WOULD LIKE TO KNOW WHAT THEIR NEXT STEP IS AS FAR AS GETTING THE FOLLOW UP TESTS. PLEASE ADVISE.

## 2021-04-17 DIAGNOSIS — R79.89 ELEVATED LFTS: Primary | ICD-10-CM

## 2021-04-19 ENCOUNTER — LAB (OUTPATIENT)
Dept: FAMILY MEDICINE CLINIC | Facility: CLINIC | Age: 61
End: 2021-04-19

## 2021-04-19 ENCOUNTER — HOSPITAL ENCOUNTER (OUTPATIENT)
Dept: CARDIOLOGY | Facility: HOSPITAL | Age: 61
Discharge: HOME | End: 2021-04-19
Admitting: INTERNAL MEDICINE

## 2021-04-19 VITALS
HEIGHT: 62 IN | DIASTOLIC BLOOD PRESSURE: 75 MMHG | BODY MASS INDEX: 29.82 KG/M2 | HEART RATE: 81 BPM | SYSTOLIC BLOOD PRESSURE: 126 MMHG | WEIGHT: 162.04 LBS

## 2021-04-19 DIAGNOSIS — R00.0 SINUS TACHYCARDIA: ICD-10-CM

## 2021-04-19 LAB
ANION GAP SERPL CALCULATED.3IONS-SCNC: 12 MMOL/L (ref 5–15)
AORTIC ARCH: 2.3 CM
ASCENDING AORTA: 3 CM
BH CV ECHO MEAS - ACS: 1.6 CM
BH CV ECHO MEAS - AO MAX PG (FULL): 1.2 MMHG
BH CV ECHO MEAS - AO MAX PG: 6.1 MMHG
BH CV ECHO MEAS - AO MEAN PG (FULL): 1 MMHG
BH CV ECHO MEAS - AO MEAN PG: 3 MMHG
BH CV ECHO MEAS - AO ROOT AREA (BSA CORRECTED): 1.7
BH CV ECHO MEAS - AO ROOT AREA: 6.6 CM^2
BH CV ECHO MEAS - AO ROOT DIAM: 2.9 CM
BH CV ECHO MEAS - AO V2 MAX: 123 CM/SEC
BH CV ECHO MEAS - AO V2 MEAN: 77.4 CM/SEC
BH CV ECHO MEAS - AO V2 VTI: 25.1 CM
BH CV ECHO MEAS - ASC AORTA: 2.9 CM
BH CV ECHO MEAS - AVA(I,A): 2.5 CM^2
BH CV ECHO MEAS - AVA(I,D): 2.5 CM^2
BH CV ECHO MEAS - AVA(V,A): 2.8 CM^2
BH CV ECHO MEAS - AVA(V,D): 2.8 CM^2
BH CV ECHO MEAS - BSA(HAYCOCK): 1.8 M^2
BH CV ECHO MEAS - BSA: 1.7 M^2
BH CV ECHO MEAS - BZI_BMI: 29.6 KILOGRAMS/M^2
BH CV ECHO MEAS - BZI_METRIC_HEIGHT: 157.5 CM
BH CV ECHO MEAS - BZI_METRIC_WEIGHT: 73.5 KG
BH CV ECHO MEAS - EDV(MOD-SP2): 54 ML
BH CV ECHO MEAS - EDV(MOD-SP4): 47 ML
BH CV ECHO MEAS - EDV(TEICH): 37.9 ML
BH CV ECHO MEAS - EF(CUBED): 68.9 %
BH CV ECHO MEAS - EF(MOD-BP): 65.1 %
BH CV ECHO MEAS - EF(MOD-SP2): 64.8 %
BH CV ECHO MEAS - EF(MOD-SP4): 68.1 %
BH CV ECHO MEAS - EF(TEICH): 62 %
BH CV ECHO MEAS - ESV(MOD-SP2): 19 ML
BH CV ECHO MEAS - ESV(MOD-SP4): 15 ML
BH CV ECHO MEAS - ESV(TEICH): 14.4 ML
BH CV ECHO MEAS - FS: 32.3 %
BH CV ECHO MEAS - IVS/LVPW: 1.1
BH CV ECHO MEAS - IVSD: 1.1 CM
BH CV ECHO MEAS - LAT PEAK E' VEL: 8.5 CM/SEC
BH CV ECHO MEAS - LV DIASTOLIC VOL/BSA (35-75): 26.9 ML/M^2
BH CV ECHO MEAS - LV MASS(C)D: 92.8 GRAMS
BH CV ECHO MEAS - LV MASS(C)DI: 53.1 GRAMS/M^2
BH CV ECHO MEAS - LV MAX PG: 4.8 MMHG
BH CV ECHO MEAS - LV MEAN PG: 2 MMHG
BH CV ECHO MEAS - LV SYSTOLIC VOL/BSA (12-30): 8.6 ML/M^2
BH CV ECHO MEAS - LV V1 MAX: 110 CM/SEC
BH CV ECHO MEAS - LV V1 MEAN: 73.2 CM/SEC
BH CV ECHO MEAS - LV V1 VTI: 19.8 CM
BH CV ECHO MEAS - LVIDD: 3.1 CM
BH CV ECHO MEAS - LVIDS: 2.1 CM
BH CV ECHO MEAS - LVLD AP2: 6.5 CM
BH CV ECHO MEAS - LVLD AP4: 6.5 CM
BH CV ECHO MEAS - LVLS AP2: 5.5 CM
BH CV ECHO MEAS - LVLS AP4: 5.1 CM
BH CV ECHO MEAS - LVOT AREA (M): 3.1 CM^2
BH CV ECHO MEAS - LVOT AREA: 3.1 CM^2
BH CV ECHO MEAS - LVOT DIAM: 2 CM
BH CV ECHO MEAS - LVPWD: 1 CM
BH CV ECHO MEAS - MED PEAK E' VEL: 8.4 CM/SEC
BH CV ECHO MEAS - MV A DUR: 0.15 SEC
BH CV ECHO MEAS - MV A MAX VEL: 89.7 CM/SEC
BH CV ECHO MEAS - MV DEC SLOPE: 499 CM/SEC^2
BH CV ECHO MEAS - MV DEC TIME: 0.2 SEC
BH CV ECHO MEAS - MV E MAX VEL: 74.4 CM/SEC
BH CV ECHO MEAS - MV E/A: 0.83
BH CV ECHO MEAS - MV MAX PG: 3.6 MMHG
BH CV ECHO MEAS - MV MEAN PG: 2 MMHG
BH CV ECHO MEAS - MV P1/2T MAX VEL: 93.7 CM/SEC
BH CV ECHO MEAS - MV P1/2T: 55 MSEC
BH CV ECHO MEAS - MV V2 MAX: 94.7 CM/SEC
BH CV ECHO MEAS - MV V2 MEAN: 57.9 CM/SEC
BH CV ECHO MEAS - MV V2 VTI: 20 CM
BH CV ECHO MEAS - MVA P1/2T LCG: 2.3 CM^2
BH CV ECHO MEAS - MVA(P1/2T): 4 CM^2
BH CV ECHO MEAS - MVA(VTI): 3.1 CM^2
BH CV ECHO MEAS - PA ACC TIME: 0.14 SEC
BH CV ECHO MEAS - PA MAX PG (FULL): 5.7 MMHG
BH CV ECHO MEAS - PA MAX PG: 8.4 MMHG
BH CV ECHO MEAS - PA PR(ACCEL): 13.8 MMHG
BH CV ECHO MEAS - PA V2 MAX: 145 CM/SEC
BH CV ECHO MEAS - PULM A REVS DUR: 0.13 SEC
BH CV ECHO MEAS - PULM A REVS VEL: 49.3 CM/SEC
BH CV ECHO MEAS - PULM DIAS VEL: 45.1 CM/SEC
BH CV ECHO MEAS - PULM S/D: 1.3
BH CV ECHO MEAS - PULM SYS VEL: 58.2 CM/SEC
BH CV ECHO MEAS - PVA(V,A): 1.8 CM^2
BH CV ECHO MEAS - PVA(V,D): 1.8 CM^2
BH CV ECHO MEAS - QP/QS: 1
BH CV ECHO MEAS - RAP SYSTOLE: 3 MMHG
BH CV ECHO MEAS - RV MAX PG: 2.7 MMHG
BH CV ECHO MEAS - RV MEAN PG: 1 MMHG
BH CV ECHO MEAS - RV V1 MAX: 82.5 CM/SEC
BH CV ECHO MEAS - RV V1 MEAN: 57.8 CM/SEC
BH CV ECHO MEAS - RV V1 VTI: 20.2 CM
BH CV ECHO MEAS - RVOT AREA: 3.1 CM^2
BH CV ECHO MEAS - RVOT DIAM: 2 CM
BH CV ECHO MEAS - SI(AO): 94.9 ML/M^2
BH CV ECHO MEAS - SI(CUBED): 11.7 ML/M^2
BH CV ECHO MEAS - SI(LVOT): 35.6 ML/M^2
BH CV ECHO MEAS - SI(MOD-SP2): 20 ML/M^2
BH CV ECHO MEAS - SI(MOD-SP4): 18.3 ML/M^2
BH CV ECHO MEAS - SI(TEICH): 13.5 ML/M^2
BH CV ECHO MEAS - SUP REN AO DIAM: 2 CM
BH CV ECHO MEAS - SV(AO): 165.8 ML
BH CV ECHO MEAS - SV(CUBED): 20.5 ML
BH CV ECHO MEAS - SV(LVOT): 62.2 ML
BH CV ECHO MEAS - SV(MOD-SP2): 35 ML
BH CV ECHO MEAS - SV(MOD-SP4): 32 ML
BH CV ECHO MEAS - SV(RVOT): 63.5 ML
BH CV ECHO MEAS - SV(TEICH): 23.5 ML
BH CV ECHO MEAS - TAPSE (>1.6): 2.1 CM
BH CV ECHO MEASUREMENTS AVERAGE E/E' RATIO: 8.8
BH CV XLRA - RV BASE: 2.5 CM
BH CV XLRA - RV LENGTH: 4.7 CM
BH CV XLRA - RV MID: 2 CM
BH CV XLRA - TDI S': 16.5 CM/SEC
BUN SERPL-MCNC: 9 MG/DL (ref 6–20)
BUN/CREAT SERPL: 13.8 (ref 7–25)
CALCIUM SPEC-SCNC: 8.9 MG/DL (ref 8.6–10.5)
CHLORIDE SERPL-SCNC: 104 MMOL/L (ref 98–107)
CO2 SERPL-SCNC: 24 MMOL/L (ref 22–29)
CREAT SERPL-MCNC: 0.65 MG/DL (ref 0.57–1)
GFR SERPL CREATININE-BSD FRML MDRD: 94 ML/MIN/1.73
GLUCOSE SERPL-MCNC: 107 MG/DL (ref 65–99)
HAV IGM SERPL QL IA: NORMAL
HBA1C MFR BLD: 5.48 % (ref 4.8–5.6)
HBV CORE IGM SERPL QL IA: NORMAL
HBV SURFACE AG SERPL QL IA: NORMAL
HCV AB SER DONR QL: NORMAL
LEFT ATRIUM VOLUME INDEX: 17 ML/M2
MAXIMAL PREDICTED HEART RATE: 162 BPM
POTASSIUM SERPL-SCNC: 4.5 MMOL/L (ref 3.5–5.2)
SINUS: 3 CM
SODIUM SERPL-SCNC: 140 MMOL/L (ref 136–145)
STJ: 2.6 CM
STRESS TARGET HR: 138 BPM

## 2021-04-19 PROCEDURE — 83036 HEMOGLOBIN GLYCOSYLATED A1C: CPT | Performed by: INTERNAL MEDICINE

## 2021-04-19 PROCEDURE — 93306 TTE W/DOPPLER COMPLETE: CPT

## 2021-04-19 PROCEDURE — 80048 BASIC METABOLIC PNL TOTAL CA: CPT | Performed by: INTERNAL MEDICINE

## 2021-04-19 PROCEDURE — 36415 COLL VENOUS BLD VENIPUNCTURE: CPT | Performed by: INTERNAL MEDICINE

## 2021-04-19 PROCEDURE — 80074 ACUTE HEPATITIS PANEL: CPT | Performed by: INTERNAL MEDICINE

## 2021-04-19 PROCEDURE — 93306 TTE W/DOPPLER COMPLETE: CPT | Performed by: INTERNAL MEDICINE

## 2021-04-20 NOTE — PROGRESS NOTES
Called and discussed results of normal echocardiogram.  She still having issues with dyspnea.  I did suggest the possibility of seeding with a stress test as a next step to rule out cardiac cause of her symptoms.  Her  and the patient report they want a call back when they are ready to schedule.  We will call her back when her Holter monitor results are available.

## 2021-04-20 NOTE — TELEPHONE ENCOUNTER
Patient's blood sugar is normal.  Patient has elevated liver function test.  Hepatitis test negative.  Recommend ultrasound

## 2021-05-03 ENCOUNTER — APPOINTMENT (OUTPATIENT)
Dept: ULTRASOUND IMAGING | Facility: HOSPITAL | Age: 61
End: 2021-05-03

## 2021-05-11 ENCOUNTER — HOSPITAL ENCOUNTER (OUTPATIENT)
Dept: ULTRASOUND IMAGING | Facility: HOSPITAL | Age: 61
Discharge: HOME | End: 2021-05-11
Admitting: INTERNAL MEDICINE

## 2021-05-11 DIAGNOSIS — R79.89 ELEVATED LFTS: ICD-10-CM

## 2021-05-11 PROBLEM — K80.20 GALLSTONES: Status: ACTIVE | Noted: 2021-05-11

## 2021-05-11 PROBLEM — K76.0 FATTY LIVER: Status: ACTIVE | Noted: 2021-05-11

## 2021-05-11 PROCEDURE — 76700 US EXAM ABDOM COMPLETE: CPT

## 2021-05-18 ENCOUNTER — OFFICE VISIT (OUTPATIENT)
Dept: FAMILY MEDICINE CLINIC | Facility: CLINIC | Age: 61
End: 2021-05-18

## 2021-05-18 VITALS
OXYGEN SATURATION: 97 % | BODY MASS INDEX: 29.7 KG/M2 | TEMPERATURE: 97.8 F | HEIGHT: 62 IN | HEART RATE: 98 BPM | WEIGHT: 161.4 LBS | SYSTOLIC BLOOD PRESSURE: 120 MMHG | DIASTOLIC BLOOD PRESSURE: 80 MMHG

## 2021-05-18 DIAGNOSIS — E78.00 PURE HYPERCHOLESTEROLEMIA: ICD-10-CM

## 2021-05-18 DIAGNOSIS — R79.89 ELEVATED LFTS: ICD-10-CM

## 2021-05-18 DIAGNOSIS — K80.20 GALLSTONES: Primary | ICD-10-CM

## 2021-05-18 PROCEDURE — 99213 OFFICE O/P EST LOW 20 MIN: CPT | Performed by: INTERNAL MEDICINE

## 2021-05-18 RX ORDER — PRAVASTATIN SODIUM 40 MG
40 TABLET ORAL DAILY
Qty: 30 TABLET | Refills: 3 | Status: SHIPPED | OUTPATIENT
Start: 2021-05-18 | End: 2021-08-20 | Stop reason: SDUPTHER

## 2021-05-18 NOTE — PROGRESS NOTES
Subjective   Glenis Bernardo is a 60 y.o. female.     Vitals:    05/18/21 1554   BP: 120/80   Pulse: 98   Temp: 97.8 °F (36.6 °C)   SpO2: 97%      Body mass index is 29.52 kg/m².     History of Present Illness   Patient was seen for gallstones.  Patient's ultrasound showed gallstones present.  She has had epigastric distress and is being referred to general surgeon.  Patient also has elevated LFTs.  The ultrasound showed fatty liver.  Patient was placed on vitamin E 200 units daily.  Patient's lipids treated with diet exercise..  Triglycerides were 94, HDL 70, .  Patient has a history of myalgias with rosuvastatin.  Patient was placed on pravastatin 40 mg daily.  Patient was also advised to take co-Q10.  Patient will follow up in 1 month.    Dictated utilizing Dragon dictation. If there are questions or for further clarification, please contact me.  The following portions of the patient's history were reviewed and updated as appropriate: allergies, current medications, past family history, past medical history, past social history, past surgical history and problem list.    Review of Systems   Constitutional: Negative for fatigue and fever.   HENT: Positive for congestion. Negative for trouble swallowing.    Eyes: Negative for discharge and visual disturbance.   Respiratory: Negative for choking and shortness of breath.    Cardiovascular: Negative for chest pain and palpitations.   Gastrointestinal: Negative for abdominal pain and blood in stool.   Endocrine: Negative.    Genitourinary: Negative for genital sores and hematuria.   Musculoskeletal: Negative for gait problem and joint swelling.   Skin: Negative for color change, pallor, rash and wound.   Allergic/Immunologic: Positive for environmental allergies. Negative for immunocompromised state.   Neurological: Negative for facial asymmetry and speech difficulty.   Psychiatric/Behavioral: Negative for hallucinations and suicidal ideas.       Objective    Physical Exam  Vitals and nursing note reviewed.   Constitutional:       Appearance: Normal appearance. She is well-developed.   HENT:      Head: Normocephalic and atraumatic.      Nose: Nose normal.      Mouth/Throat:      Mouth: Mucous membranes are moist.      Pharynx: Oropharynx is clear.   Eyes:      Extraocular Movements: Extraocular movements intact.      Conjunctiva/sclera: Conjunctivae normal.      Pupils: Pupils are equal, round, and reactive to light.   Cardiovascular:      Rate and Rhythm: Normal rate and regular rhythm.      Heart sounds: Normal heart sounds. No murmur heard.   No friction rub. No gallop.    Pulmonary:      Effort: Pulmonary effort is normal. No respiratory distress.      Breath sounds: Normal breath sounds. No stridor. No wheezing, rhonchi or rales.   Chest:      Chest wall: No tenderness.   Abdominal:      General: Bowel sounds are normal.      Palpations: Abdomen is soft.   Musculoskeletal:         General: Normal range of motion.      Cervical back: Normal range of motion and neck supple.   Skin:     General: Skin is warm and dry.   Neurological:      General: No focal deficit present.      Mental Status: She is alert and oriented to person, place, and time. Mental status is at baseline.   Psychiatric:         Mood and Affect: Mood normal.         Behavior: Behavior normal.         Thought Content: Thought content normal.         Judgment: Judgment normal.         Assessment/Plan #1 refer to general surgeon #2 pravastatin 40 mg daily with co-Q10 #3 vitamin E 200 units daily  Problems Addressed this Visit     Gastrointestinal Abdominal             Gallstones - Primary    Relevant Orders    Ambulatory Referral to Gastroenterology            Other Visit Diagnoses     Elevated LFTs        Pure hypercholesterolemia        Relevant Medications    pravastatin (Pravachol) 40 MG tablet    Other Relevant Orders    Hepatic Function Panel    Comprehensive Metabolic Panel    Lipid Panel       Diagnoses     Diagnosis Codes Comments    Gallstones    -  Primary ICD-10-CM: K80.20  ICD-9-CM: 574.20     Elevated LFTs     ICD-10-CM: R79.89  ICD-9-CM: 790.6     Pure hypercholesterolemia     ICD-10-CM: E78.00  ICD-9-CM: 272.0

## 2021-05-24 ENCOUNTER — TELEPHONE (OUTPATIENT)
Dept: GASTROENTEROLOGY | Facility: CLINIC | Age: 61
End: 2021-05-24

## 2021-06-14 ENCOUNTER — LAB (OUTPATIENT)
Dept: FAMILY MEDICINE CLINIC | Facility: CLINIC | Age: 61
End: 2021-06-14

## 2021-06-14 DIAGNOSIS — E78.00 PURE HYPERCHOLESTEROLEMIA: ICD-10-CM

## 2021-06-14 LAB
ALBUMIN SERPL-MCNC: 4.2 G/DL (ref 3.5–5.2)
ALP SERPL-CCNC: 77 U/L (ref 39–117)
ALT SERPL W P-5'-P-CCNC: 24 U/L (ref 1–33)
AST SERPL-CCNC: 24 U/L (ref 1–32)
BILIRUB CONJ SERPL-MCNC: <0.2 MG/DL (ref 0–0.3)
BILIRUB INDIRECT SERPL-MCNC: NORMAL MG/DL
BILIRUB SERPL-MCNC: 0.3 MG/DL (ref 0–1.2)
PROT SERPL-MCNC: 7.2 G/DL (ref 6–8.5)

## 2021-06-14 PROCEDURE — 80076 HEPATIC FUNCTION PANEL: CPT | Performed by: INTERNAL MEDICINE

## 2021-06-14 PROCEDURE — 36415 COLL VENOUS BLD VENIPUNCTURE: CPT | Performed by: INTERNAL MEDICINE

## 2021-07-02 ENCOUNTER — TELEPHONE (OUTPATIENT)
Dept: GASTROENTEROLOGY | Facility: CLINIC | Age: 61
End: 2021-07-02

## 2021-07-02 ENCOUNTER — OFFICE VISIT (OUTPATIENT)
Dept: GASTROENTEROLOGY | Facility: CLINIC | Age: 61
End: 2021-07-02

## 2021-07-02 VITALS — WEIGHT: 162.5 LBS | BODY MASS INDEX: 29.9 KG/M2 | HEIGHT: 62 IN

## 2021-07-02 DIAGNOSIS — R10.12 LEFT UPPER QUADRANT ABDOMINAL PAIN: Primary | ICD-10-CM

## 2021-07-02 DIAGNOSIS — K63.5 POLYP OF COLON, UNSPECIFIED PART OF COLON, UNSPECIFIED TYPE: ICD-10-CM

## 2021-07-02 PROCEDURE — 99204 OFFICE O/P NEW MOD 45 MIN: CPT | Performed by: INTERNAL MEDICINE

## 2021-07-02 RX ORDER — SODIUM CHLORIDE, SODIUM LACTATE, POTASSIUM CHLORIDE, CALCIUM CHLORIDE 600; 310; 30; 20 MG/100ML; MG/100ML; MG/100ML; MG/100ML
30 INJECTION, SOLUTION INTRAVENOUS CONTINUOUS
Status: CANCELLED | OUTPATIENT
Start: 2021-08-05

## 2021-07-02 RX ORDER — PANTOPRAZOLE SODIUM 40 MG/1
40 TABLET, DELAYED RELEASE ORAL DAILY
Qty: 30 TABLET | Refills: 5 | Status: SHIPPED | OUTPATIENT
Start: 2021-07-02 | End: 2021-12-27

## 2021-07-02 RX ORDER — UBIDECARENONE 100 MG
100 CAPSULE ORAL DAILY
COMMUNITY

## 2021-07-02 NOTE — PROGRESS NOTES
Chief Complaint   Patient presents with   • Discuss possible colonoscopy   • Heartburn     Subjective      HPI     Glenis Bernardo is a 60 y.o. female who presents today for new patient evaluation.  Referred by her PCP for evaluation of gallstones.  She recently had an ultrasound of the liver performed for intermittent elevation of liver function test.  She had a 1.6 cm gallstone in the gallbladder.  There was no evidence of gallbladder wall thickening.  There is no dilation of the common bile duct.  She is noted to have fatty liver.  She had an ultrasound in 2017 with similar findings.  She denies any right upper quadrant pain.  She does occasionally have some left scapular pain after eating this seems to improve after taking a Tums or Rolaids.  She does not take regular prescription heartburn medication.  Cannot recall if she is ever had an EGD.  Colonoscopy in 2018 with 4 tubular adenomas with recommendation for surveillance in 3 years.    Objective   There were no vitals filed for this visit.  Physical Exam  Vitals reviewed.   Constitutional:       Appearance: She is well-developed.   Cardiovascular:      Rate and Rhythm: Normal rate and regular rhythm.      Heart sounds: Normal heart sounds.   Pulmonary:      Effort: Pulmonary effort is normal. No respiratory distress.      Breath sounds: Normal breath sounds. No wheezing.   Abdominal:      General: Bowel sounds are normal.      Palpations: Abdomen is soft.   Musculoskeletal:      Cervical back: Normal range of motion and neck supple.   Skin:     General: Skin is warm and dry.   Neurological:      Mental Status: She is alert and oriented to person, place, and time.   Psychiatric:         Mood and Affect: Mood and affect normal.         Behavior: Behavior normal.         Thought Content: Thought content normal.         Cognition and Memory: Memory normal.         Judgment: Judgment normal.       The following data was reviewed by: Juan Espinosa MD on  07/02/2021:  Common labs    Common Labsle 4/14/21 4/14/21 4/14/21 4/19/21 4/19/21 6/14/21    1531 1540 1540 0833 0833    Glucose   179 (A)  107 (A)    BUN   11  9    Creatinine   0.74  0.65    eGFR Non African Am   81  94    Sodium   137  140    Potassium   3.9  4.5    Chloride   98  104    Calcium   9.5  8.9    Albumin   4.40   4.20   Total Bilirubin   0.7   0.3   Alkaline Phosphatase   87   77   AST (SGOT)   50 (A)   24   ALT (SGPT)   52 (A)   24   WBC 12.18 (A)        Hemoglobin 15.1        Hematocrit 41.8        Platelets 316        Total Cholesterol  251 (A)       Triglycerides  94       HDL Cholesterol  70 (A)       LDL Cholesterol   165 (A)       Hemoglobin A1C    5.48     (A) Abnormal value            Data reviewed: Radiologic studies US liver from 5/2021     Assessment/Plan   Assessment:     1. Left upper quadrant abdominal pain    2. Polyp of colon, unspecified part of colon, unspecified type    3.      Gallstones  4.      Fatty liver    Plan:   Patient has some left upper quadrant and scapular pain not classic for biliary colic. Gallstone present dating back to at least 2017.  Recommend we arrange for EGD   Start protonix 40mg/day  We will perform surveillance colonoscopy at the same time as EGD.          Juan Espinosa M.D.  [facility]  [address] [place] [address]  Office: [phone]

## 2021-07-27 ENCOUNTER — TRANSCRIBE ORDERS (OUTPATIENT)
Dept: SLEEP MEDICINE | Facility: HOSPITAL | Age: 61
End: 2021-07-27

## 2021-07-27 DIAGNOSIS — Z01.818 OTHER SPECIFIED PRE-OPERATIVE EXAMINATION: Primary | ICD-10-CM

## 2021-08-03 ENCOUNTER — LAB (OUTPATIENT)
Dept: LAB | Facility: HOSPITAL | Age: 61
End: 2021-08-03

## 2021-08-03 DIAGNOSIS — Z01.818 OTHER SPECIFIED PRE-OPERATIVE EXAMINATION: ICD-10-CM

## 2021-08-03 LAB — SARS-COV-2 ORF1AB RESP QL NAA+PROBE: NOT DETECTED

## 2021-08-03 PROCEDURE — U0004 COV-19 TEST NON-CDC HGH THRU: HCPCS

## 2021-08-03 PROCEDURE — C9803 HOPD COVID-19 SPEC COLLECT: HCPCS

## 2021-08-05 ENCOUNTER — ANESTHESIA (OUTPATIENT)
Dept: GASTROENTEROLOGY | Facility: HOSPITAL | Age: 61
End: 2021-08-05

## 2021-08-05 ENCOUNTER — ANESTHESIA EVENT (OUTPATIENT)
Dept: GASTROENTEROLOGY | Facility: HOSPITAL | Age: 61
End: 2021-08-05

## 2021-08-05 ENCOUNTER — HOSPITAL ENCOUNTER (OUTPATIENT)
Facility: HOSPITAL | Age: 61
Setting detail: HOSPITAL OUTPATIENT SURGERY
Discharge: HOME | End: 2021-08-05
Attending: INTERNAL MEDICINE | Admitting: INTERNAL MEDICINE

## 2021-08-05 VITALS
OXYGEN SATURATION: 100 % | DIASTOLIC BLOOD PRESSURE: 68 MMHG | HEIGHT: 62 IN | RESPIRATION RATE: 16 BRPM | SYSTOLIC BLOOD PRESSURE: 111 MMHG | BODY MASS INDEX: 30.18 KG/M2 | WEIGHT: 164 LBS | HEART RATE: 61 BPM

## 2021-08-05 DIAGNOSIS — R10.12 LEFT UPPER QUADRANT ABDOMINAL PAIN: ICD-10-CM

## 2021-08-05 DIAGNOSIS — K63.5 POLYP OF COLON, UNSPECIFIED PART OF COLON, UNSPECIFIED TYPE: ICD-10-CM

## 2021-08-05 PROCEDURE — 88342 IMHCHEM/IMCYTCHM 1ST ANTB: CPT | Performed by: INTERNAL MEDICINE

## 2021-08-05 PROCEDURE — 43239 EGD BIOPSY SINGLE/MULTIPLE: CPT | Performed by: INTERNAL MEDICINE

## 2021-08-05 PROCEDURE — 25010000002 PROPOFOL 10 MG/ML EMULSION: Performed by: ANESTHESIOLOGY

## 2021-08-05 PROCEDURE — 88305 TISSUE EXAM BY PATHOLOGIST: CPT | Performed by: INTERNAL MEDICINE

## 2021-08-05 PROCEDURE — 45378 DIAGNOSTIC COLONOSCOPY: CPT | Performed by: INTERNAL MEDICINE

## 2021-08-05 RX ORDER — SODIUM CHLORIDE, SODIUM LACTATE, POTASSIUM CHLORIDE, CALCIUM CHLORIDE 600; 310; 30; 20 MG/100ML; MG/100ML; MG/100ML; MG/100ML
30 INJECTION, SOLUTION INTRAVENOUS CONTINUOUS
Status: DISCONTINUED | OUTPATIENT
Start: 2021-08-05 | End: 2021-08-05 | Stop reason: HOSPADM

## 2021-08-05 RX ORDER — GLYCOPYRROLATE 0.2 MG/ML
INJECTION INTRAMUSCULAR; INTRAVENOUS AS NEEDED
Status: DISCONTINUED | OUTPATIENT
Start: 2021-08-05 | End: 2021-08-05 | Stop reason: SURG

## 2021-08-05 RX ORDER — PROPOFOL 10 MG/ML
VIAL (ML) INTRAVENOUS AS NEEDED
Status: DISCONTINUED | OUTPATIENT
Start: 2021-08-05 | End: 2021-08-05 | Stop reason: SURG

## 2021-08-05 RX ORDER — LIDOCAINE HYDROCHLORIDE 20 MG/ML
INJECTION, SOLUTION INFILTRATION; PERINEURAL AS NEEDED
Status: DISCONTINUED | OUTPATIENT
Start: 2021-08-05 | End: 2021-08-05 | Stop reason: SURG

## 2021-08-05 RX ORDER — MIDAZOLAM HYDROCHLORIDE 1 MG/ML
1 INJECTION INTRAMUSCULAR; INTRAVENOUS
Status: CANCELLED | OUTPATIENT
Start: 2021-08-05

## 2021-08-05 RX ORDER — PROPOFOL 10 MG/ML
VIAL (ML) INTRAVENOUS CONTINUOUS PRN
Status: DISCONTINUED | OUTPATIENT
Start: 2021-08-05 | End: 2021-08-05 | Stop reason: SURG

## 2021-08-05 RX ADMIN — Medication 180 MCG/KG/MIN: at 08:06

## 2021-08-05 RX ADMIN — LIDOCAINE HYDROCHLORIDE 60 MG: 20 INJECTION, SOLUTION INFILTRATION; PERINEURAL at 08:06

## 2021-08-05 RX ADMIN — PROPOFOL 140 MG: 10 INJECTION, EMULSION INTRAVENOUS at 08:06

## 2021-08-05 RX ADMIN — GLYCOPYRROLATE 0.1 MG: 1 INJECTION INTRAMUSCULAR; INTRAVENOUS at 08:02

## 2021-08-05 RX ADMIN — SODIUM CHLORIDE, POTASSIUM CHLORIDE, SODIUM LACTATE AND CALCIUM CHLORIDE 30 ML/HR: 600; 310; 30; 20 INJECTION, SOLUTION INTRAVENOUS at 07:23

## 2021-08-05 NOTE — H&P
[facility]  Pre Procedure History & Physical    Chief Complaint:   Abdominal pain    Subjective     HPI:   Glenis Bernardo is a 60 y.o. female who presents today for new patient evaluation.  Referred by her PCP for evaluation of gallstones.  She recently had an ultrasound of the liver performed for intermittent elevation of liver function test.  She had a 1.6 cm gallstone in the gallbladder.  There was no evidence of gallbladder wall thickening.  There is no dilation of the common bile duct.  She is noted to have fatty liver.  She had an ultrasound in 2017 with similar findings.  She denies any right upper quadrant pain.  She does occasionally have some left scapular pain after eating this seems to improve after taking a Tums or Rolaids.  She does not take regular prescription heartburn medication.  Cannot recall if she is ever had an EGD.  Colonoscopy in 2018 with 4 tubular adenomas with recommendation for surveillance in 3 years.       Past Medical History:   Past Medical History:   Diagnosis Date   • Anxiety    • Choroid melanoma of left eye (CMS/HCC)    • Fatty liver    • Gallstones    • History of colon polyps    • History of radiation therapy    • LUQ pain        Past Surgical History:  Past Surgical History:   Procedure Laterality Date   • BREAST BIOPSY Left    • COLONOSCOPY N/A 2/12/2018    Procedure: COLONOSCOPY TO CECUM WITH POLYPECTOMY HOT SNARE, COLD BIOPSY, COLD SNARE;  Surgeon: Juan Espinosa MD;  Location: [facility] ENDOSCOPY;  Service:    • EYE SURGERY         Family History:  Family History   Problem Relation Age of Onset   • Heart failure Mother    • Heart failure Father    • Diabetes Sister    • Skin cancer Sister    • Diabetes Brother    • Cancer Sister    • Diabetes Sister    • Prostate cancer Brother    • Cirrhosis Sister    • Parkinsonism Brother    • Malig Hyperthermia Neg Hx        Social History:   reports that she quit smoking about 8 years ago. Her smoking use  "included electronic cigarette and cigarettes. She has never used smokeless tobacco. She reports current alcohol use of about 2.0 - 3.0 standard drinks of alcohol per week. She reports that she does not use drugs.    Medications:   Medications Prior to Admission   Medication Sig Dispense Refill Last Dose   • alendronate (FOSAMAX) 70 MG tablet Take 1 tablet by mouth Every 7 (Seven) Days. 4 tablet 11    • Calcium Carbonate Antacid (ANTACID E-X PO) Take  by mouth.      • coenzyme Q10 100 MG capsule Take 100 mg by mouth Daily.      • Glycerin-Hypromellose- (DRY EYE RELIEF DROPS OP) Apply  to eye(s) as directed by provider.      • LORazepam (Ativan) 1 MG tablet 1 po bid prn anxiety 60 tablet 4 8/5/2021 at Unknown time   • nystatin (MYCOSTATIN) 033990 UNIT/ML suspension Take 5 mL by mouth 4 (Four) Times a Day. 5 mL PO QID x 5 days, retain in mouth as long as possible, use 48 hours after sx resolve 120 mL 0    • pantoprazole (PROTONIX) 40 MG EC tablet Take 1 tablet by mouth Daily. 30 tablet 5    • pravastatin (Pravachol) 40 MG tablet Take 1 tablet by mouth Daily. 30 tablet 3    • vitamin D3 125 MCG (5000 UT) capsule capsule Take 5,000 Units by mouth Daily.      • vitamin E 200 UNIT capsule Take 1 capsule by mouth Daily. 30 capsule 3        Allergies:  Crestor [rosuvastatin]    ROS:    Pertinent items are noted in HPI     Objective     Blood pressure 129/79, pulse 90, resp. rate 14, height 157.5 cm (62\"), weight 74.4 kg (164 lb), SpO2 100 %.    Physical Exam   Constitutional: Pt is oriented to person, place, and time and well-developed, well-nourished, and in no distress.   Mouth/Throat: Oropharynx is clear and moist.   Neck: Normal range of motion.   Cardiovascular: Normal rate, regular rhythm and normal heart sounds.    Pulmonary/Chest: Effort normal and breath sounds normal.   Abdominal: Soft. Nontender  Skin: Skin is warm and dry.   Psychiatric: Mood, memory, affect and judgment normal.     Assessment/Plan "     Diagnosis:  Abdominal pain    Anticipated Surgical Procedure:  EGD  Colonoscopy    The risks, benefits, and alternatives of this procedure have been discussed with the patient or the responsible party- the patient understands and agrees to proceed.

## 2021-08-05 NOTE — ANESTHESIA PREPROCEDURE EVALUATION
Anesthesia Evaluation     Patient summary reviewed and Nursing notes reviewed   NPO Solid Status: > 8 hours  NPO Liquid Status: > 2 hours           Airway   Mallampati: II  TM distance: >3 FB  Neck ROM: full  no difficulty expected  Dental - normal exam     Pulmonary - normal exam    breath sounds clear to auscultation  (+) a smoker Former,   (-) shortness of breath, sleep apnea, decreased breath sounds, wheezes  Cardiovascular - normal exam  Exercise tolerance: good (4-7 METS)    Rhythm: regular  Rate: normal    (+) dysrhythmias (Sinus tachycardia) Tachycardia,   (-) CAD, angina, CHF, orthopnea, PND, ORTEGA, PVD      Neuro/Psych  (+) psychiatric history Anxiety,     (-) seizures, neuromuscular disease, TIA, CVA, headaches, syncope, weakness, numbness  GI/Hepatic/Renal/Endo    (+) obesity,   liver disease fatty liver disease,   (-) diabetes    Musculoskeletal (-) negative ROS    Abdominal  - normal exam   Substance History - negative use  (-) alcohol use, drug use     OB/GYN negative ob/gyn ROS         Other - negative ROS                         Anesthesia Plan    ASA 2     MAC       Anesthetic plan, all risks, benefits, and alternatives have been provided, discussed and informed consent has been obtained with: patient.

## 2021-08-05 NOTE — DISCHARGE INSTRUCTIONS
For the next 24 hours patient needs to be with a responsible adult.    For 24 hours DO NOT drive, operate machinery, appliances, drink alcohol, make important decisions or sign legal documents.    Start with a light or bland diet if you are feeling sick to your stomach otherwise advance to regular diet as tolerated.    Follow recommendations on procedure report if provided by your doctor.    Call Dr Espinosa for problems [phone]    Problems may include but not limited to: large amounts of bleeding, trouble breathing, repeated vomiting, severe unrelieved pain, fever or chills.

## 2021-08-05 NOTE — ANESTHESIA POSTPROCEDURE EVALUATION
"Patient: Glenis Bernardo    Procedure Summary     Date: 08/05/21 Room / Location: [facility] ENDOSCOPY 10 /  [facility]    Anesthesia Start: 0757 Anesthesia Stop: 0830    Procedures:       ESOPHAGOGASTRODUODENOSCOPY WITH BIOPSY (N/A Esophagus)      COLONOSCOPY into cecum (N/A ) Diagnosis:       Left upper quadrant abdominal pain      Polyp of colon, unspecified part of colon, unspecified type      (Left upper quadrant abdominal pain [R10.12])      (Polyp of colon, unspecified part of colon, unspecified type [K63.5])    Surgeons: Juan Espinosa MD Provider: Miguel Chambers MD    Anesthesia Type: MAC ASA Status: 2          Anesthesia Type: MAC    Vitals  No vitals data found for the desired time range.          Post Anesthesia Care and Evaluation    Patient location during evaluation: PHASE II  Patient participation: complete - patient participated  Level of consciousness: awake  Pain management: adequate  Airway patency: patent  Anesthetic complications: No anesthetic complications    Cardiovascular status: acceptable  Respiratory status: acceptable  Hydration status: acceptable    Comments: /79 (BP Location: Left arm, Patient Position: Lying)   Pulse 90   Resp 14   Ht 157.5 cm (62\")   Wt 74.4 kg (164 lb)   SpO2 100%   BMI 30.00 kg/m²         "

## 2021-08-10 LAB
CYTO UR: NORMAL
LAB AP CASE REPORT: NORMAL
PATH REPORT.ADDENDUM SPEC: NORMAL
PATH REPORT.FINAL DX SPEC: NORMAL
PATH REPORT.GROSS SPEC: NORMAL

## 2021-08-20 ENCOUNTER — OFFICE VISIT (OUTPATIENT)
Dept: FAMILY MEDICINE CLINIC | Facility: CLINIC | Age: 61
End: 2021-08-20

## 2021-08-20 VITALS
HEIGHT: 62 IN | HEART RATE: 83 BPM | DIASTOLIC BLOOD PRESSURE: 70 MMHG | WEIGHT: 163 LBS | SYSTOLIC BLOOD PRESSURE: 118 MMHG | RESPIRATION RATE: 18 BRPM | BODY MASS INDEX: 30 KG/M2 | TEMPERATURE: 97.7 F | OXYGEN SATURATION: 97 %

## 2021-08-20 DIAGNOSIS — K76.0 FATTY LIVER: ICD-10-CM

## 2021-08-20 DIAGNOSIS — K80.20 GALLSTONES: ICD-10-CM

## 2021-08-20 DIAGNOSIS — R74.8 ELEVATED LIVER ENZYMES: ICD-10-CM

## 2021-08-20 DIAGNOSIS — F41.9 ANXIETY: Primary | ICD-10-CM

## 2021-08-20 PROBLEM — B00.1 FEVER BLISTER: Status: ACTIVE | Noted: 2021-08-20

## 2021-08-20 LAB
25(OH)D3 SERPL-MCNC: 51.8 NG/ML (ref 30–100)
ALBUMIN SERPL-MCNC: 4.4 G/DL (ref 3.5–5.2)
ALBUMIN/GLOB SERPL: 1.6 G/DL
ALP SERPL-CCNC: 72 U/L (ref 39–117)
ALT SERPL W P-5'-P-CCNC: 22 U/L (ref 1–33)
ANION GAP SERPL CALCULATED.3IONS-SCNC: 10.9 MMOL/L (ref 5–15)
AST SERPL-CCNC: 20 U/L (ref 1–32)
BILIRUB SERPL-MCNC: 0.3 MG/DL (ref 0–1.2)
BUN SERPL-MCNC: 14 MG/DL (ref 6–20)
BUN/CREAT SERPL: 19.2 (ref 7–25)
CALCIUM SPEC-SCNC: 9.4 MG/DL (ref 8.6–10.5)
CHLORIDE SERPL-SCNC: 103 MMOL/L (ref 98–107)
CHOLEST SERPL-MCNC: 225 MG/DL (ref 0–200)
CO2 SERPL-SCNC: 25.1 MMOL/L (ref 22–29)
CREAT SERPL-MCNC: 0.73 MG/DL (ref 0.57–1)
DEPRECATED RDW RBC AUTO: 48.7 FL (ref 37–54)
ERYTHROCYTE [DISTWIDTH] IN BLOOD BY AUTOMATED COUNT: 14 % (ref 12.3–15.4)
GFR SERPL CREATININE-BSD FRML MDRD: 82 ML/MIN/1.73
GLOBULIN UR ELPH-MCNC: 2.8 GM/DL
GLUCOSE SERPL-MCNC: 133 MG/DL (ref 65–99)
HCT VFR BLD AUTO: 41.3 % (ref 34–46.6)
HDLC SERPL-MCNC: 69 MG/DL (ref 40–60)
HGB BLD-MCNC: 13.5 G/DL (ref 12–15.9)
LDLC SERPL CALC-MCNC: 144 MG/DL (ref 0–100)
LDLC/HDLC SERPL: 2.05 {RATIO}
MCH RBC QN AUTO: 31 PG (ref 26.6–33)
MCHC RBC AUTO-ENTMCNC: 32.7 G/DL (ref 31.5–35.7)
MCV RBC AUTO: 94.9 FL (ref 79–97)
PLATELET # BLD AUTO: 269 10*3/MM3 (ref 140–450)
PMV BLD AUTO: 10.1 FL (ref 6–12)
POTASSIUM SERPL-SCNC: 5 MMOL/L (ref 3.5–5.2)
PROT SERPL-MCNC: 7.2 G/DL (ref 6–8.5)
RBC # BLD AUTO: 4.35 10*6/MM3 (ref 3.77–5.28)
SODIUM SERPL-SCNC: 139 MMOL/L (ref 136–145)
TRIGL SERPL-MCNC: 72 MG/DL (ref 0–150)
VLDLC SERPL-MCNC: 12 MG/DL (ref 5–40)
WBC # BLD AUTO: 9.71 10*3/MM3 (ref 3.4–10.8)

## 2021-08-20 PROCEDURE — 82306 VITAMIN D 25 HYDROXY: CPT | Performed by: INTERNAL MEDICINE

## 2021-08-20 PROCEDURE — 80053 COMPREHEN METABOLIC PANEL: CPT | Performed by: INTERNAL MEDICINE

## 2021-08-20 PROCEDURE — 80061 LIPID PANEL: CPT | Performed by: INTERNAL MEDICINE

## 2021-08-20 PROCEDURE — 36415 COLL VENOUS BLD VENIPUNCTURE: CPT | Performed by: INTERNAL MEDICINE

## 2021-08-20 PROCEDURE — 99213 OFFICE O/P EST LOW 20 MIN: CPT | Performed by: INTERNAL MEDICINE

## 2021-08-20 PROCEDURE — 85027 COMPLETE CBC AUTOMATED: CPT | Performed by: INTERNAL MEDICINE

## 2021-08-20 RX ORDER — MELATONIN 10 MG
CAPSULE ORAL
COMMUNITY
End: 2021-12-29 | Stop reason: HOSPADM

## 2021-08-20 RX ORDER — ACYCLOVIR 400 MG/1
400 TABLET ORAL 3 TIMES DAILY
Qty: 21 TABLET | Refills: 3 | Status: SHIPPED | OUTPATIENT
Start: 2021-08-20 | End: 2021-12-23

## 2021-08-20 RX ORDER — ZINC GLUCONATE 50 MG
TABLET ORAL
COMMUNITY
End: 2021-12-23

## 2021-08-20 RX ORDER — LORAZEPAM 1 MG/1
TABLET ORAL
Qty: 60 TABLET | Refills: 4 | Status: SHIPPED | OUTPATIENT
Start: 2021-08-20 | End: 2022-02-07

## 2021-08-20 RX ORDER — PRAVASTATIN SODIUM 40 MG
40 TABLET ORAL DAILY
Qty: 30 TABLET | Refills: 3 | Status: SHIPPED | OUTPATIENT
Start: 2021-08-20 | End: 2022-01-10

## 2021-08-20 NOTE — PROGRESS NOTES
Subjective   Glenis Bernardo is a 60 y.o. female.     Vitals:    08/20/21 0848   BP: 118/70   Pulse: 83   Resp: 18   Temp: 97.7 °F (36.5 °C)   SpO2: 97%      Body mass index is 29.81 kg/m².     History of Present Illness   Patient was seen for anxiety.  Patient is using lorazepam 1 mg twice daily as needed.  Patient's symptoms have been controlled with medication.  Patient has a history of elevated liver enzymes and is being followed.  Patient is using vitamin E 200 units daily.  Patient has gallstones but does not want them removed at the present time.  Patient states she has not been symptomatic.  Patient also has a fatty liver and is being monitored on a regular basis.    Dictated utilizing Dragon dictation. If there are questions or for further clarification, please contact me.  The following portions of the patient's history were reviewed and updated as appropriate: allergies, current medications, past family history, past medical history, past social history, past surgical history and problem list.    Review of Systems   Constitutional: Negative for fatigue and fever.   HENT: Positive for congestion. Negative for trouble swallowing.    Eyes: Negative for discharge and visual disturbance.   Respiratory: Negative for choking and shortness of breath.    Cardiovascular: Negative for chest pain and palpitations.   Gastrointestinal: Negative for abdominal pain and blood in stool.   Endocrine: Negative.    Genitourinary: Negative for genital sores and hematuria.   Musculoskeletal: Negative for gait problem and joint swelling.   Skin: Negative for color change, pallor, rash and wound.   Allergic/Immunologic: Positive for environmental allergies. Negative for immunocompromised state.   Neurological: Negative for facial asymmetry and speech difficulty.   Psychiatric/Behavioral: Negative for hallucinations and suicidal ideas. The patient is nervous/anxious.        Objective   Physical Exam  Vitals and nursing note reviewed.    Constitutional:       Appearance: Normal appearance. She is well-developed.   HENT:      Head: Normocephalic and atraumatic.      Nose: Nose normal.      Mouth/Throat:      Mouth: Mucous membranes are moist.      Pharynx: Oropharynx is clear.   Eyes:      Extraocular Movements: Extraocular movements intact.      Conjunctiva/sclera: Conjunctivae normal.      Pupils: Pupils are equal, round, and reactive to light.   Cardiovascular:      Rate and Rhythm: Normal rate and regular rhythm.      Heart sounds: Normal heart sounds.   Pulmonary:      Effort: Pulmonary effort is normal.      Breath sounds: Normal breath sounds.   Abdominal:      General: Bowel sounds are normal.      Palpations: Abdomen is soft.   Musculoskeletal:         General: Normal range of motion.      Cervical back: Normal range of motion and neck supple.   Skin:     General: Skin is warm and dry.   Neurological:      General: No focal deficit present.      Mental Status: She is alert and oriented to person, place, and time. Mental status is at baseline.   Psychiatric:         Behavior: Behavior normal.         Thought Content: Thought content normal.         Judgment: Judgment normal.      Comments: Anxiety controlled with medication         Assessment/Plan #1 continue vitamin E No. 2 continue to monitor liver #3 continue lorazepam  Problems Addressed this Visit     Gastrointestinal Abdominal             Elevated liver enzymes    Relevant Orders    CBC (No Diff) (Completed)    Comprehensive Metabolic Panel (Completed)    Lipid Panel (Completed)    Vitamin D 25 Hydroxy (Completed)    Gallstones    Relevant Orders    CBC (No Diff) (Completed)    Comprehensive Metabolic Panel (Completed)    Lipid Panel (Completed)    Vitamin D 25 Hydroxy (Completed)    Fatty liver    Relevant Orders    CBC (No Diff) (Completed)    Comprehensive Metabolic Panel (Completed)    Lipid Panel (Completed)    Vitamin D 25 Hydroxy (Completed)          Mental Health             Anxiety - Primary    Relevant Medications    LORazepam (Ativan) 1 MG tablet            Diagnoses     Diagnosis Codes Comments    Anxiety    -  Primary ICD-10-CM: F41.9  ICD-9-CM: 300.00     Fatty liver     ICD-10-CM: K76.0  ICD-9-CM: 571.8     Gallstones     ICD-10-CM: K80.20  ICD-9-CM: 574.20     Elevated liver enzymes     ICD-10-CM: R74.8  ICD-9-CM: 790.5

## 2021-09-08 ENCOUNTER — TELEPHONE (OUTPATIENT)
Dept: GASTROENTEROLOGY | Facility: CLINIC | Age: 61
End: 2021-09-08

## 2021-09-08 NOTE — TELEPHONE ENCOUNTER
----- Message from Juan Espinosa MD sent at 9/1/2021  7:21 AM EDT -----  Mild gastritis, no HP  Continue PPI  REcall colon 10 years

## 2021-09-28 ENCOUNTER — LAB (OUTPATIENT)
Dept: FAMILY MEDICINE CLINIC | Facility: CLINIC | Age: 61
End: 2021-09-28

## 2021-09-28 DIAGNOSIS — E78.00 PURE HYPERCHOLESTEROLEMIA: ICD-10-CM

## 2021-09-28 LAB
ALBUMIN SERPL-MCNC: 4.2 G/DL (ref 3.5–5.2)
ALBUMIN/GLOB SERPL: 1.5 G/DL
ALP SERPL-CCNC: 76 U/L (ref 39–117)
ALT SERPL W P-5'-P-CCNC: 23 U/L (ref 1–33)
ANION GAP SERPL CALCULATED.3IONS-SCNC: 12.5 MMOL/L (ref 5–15)
AST SERPL-CCNC: 21 U/L (ref 1–32)
BILIRUB SERPL-MCNC: 0.3 MG/DL (ref 0–1.2)
BUN SERPL-MCNC: 10 MG/DL (ref 6–20)
BUN/CREAT SERPL: 20.8 (ref 7–25)
CALCIUM SPEC-SCNC: 9.1 MG/DL (ref 8.6–10.5)
CHLORIDE SERPL-SCNC: 103 MMOL/L (ref 98–107)
CHOLEST SERPL-MCNC: 185 MG/DL (ref 0–200)
CO2 SERPL-SCNC: 24.5 MMOL/L (ref 22–29)
CREAT SERPL-MCNC: 0.48 MG/DL (ref 0.57–1)
GFR SERPL CREATININE-BSD FRML MDRD: 132 ML/MIN/1.73
GLOBULIN UR ELPH-MCNC: 2.8 GM/DL
GLUCOSE SERPL-MCNC: 117 MG/DL (ref 65–99)
HDLC SERPL-MCNC: 53 MG/DL (ref 40–60)
LDLC SERPL CALC-MCNC: 111 MG/DL (ref 0–100)
LDLC/HDLC SERPL: 2.05 {RATIO}
POTASSIUM SERPL-SCNC: 4.7 MMOL/L (ref 3.5–5.2)
PROT SERPL-MCNC: 7 G/DL (ref 6–8.5)
SODIUM SERPL-SCNC: 140 MMOL/L (ref 136–145)
TRIGL SERPL-MCNC: 116 MG/DL (ref 0–150)
VLDLC SERPL-MCNC: 21 MG/DL (ref 5–40)

## 2021-09-28 PROCEDURE — 80061 LIPID PANEL: CPT | Performed by: INTERNAL MEDICINE

## 2021-09-28 PROCEDURE — 80053 COMPREHEN METABOLIC PANEL: CPT | Performed by: INTERNAL MEDICINE

## 2021-09-28 PROCEDURE — 36415 COLL VENOUS BLD VENIPUNCTURE: CPT | Performed by: INTERNAL MEDICINE

## 2021-10-04 ENCOUNTER — TELEPHONE (OUTPATIENT)
Dept: FAMILY MEDICINE CLINIC | Facility: CLINIC | Age: 61
End: 2021-10-04

## 2021-10-04 DIAGNOSIS — R30.0 DYSURIA: Primary | ICD-10-CM

## 2021-10-04 NOTE — TELEPHONE ENCOUNTER
Caller: Glenis Bernardo    Relationship: Self    Best call back number: [phone]    What orders are you requesting (i.e. lab or imaging): URINE CULTURE    In what timeframe would the patient need to come in: ASAP    Where will you receive your lab/imaging services: [facility]    Additional notes: PATIENT HAS BEEN EXPERIENCING PAINFUL URINATION

## 2021-12-13 ENCOUNTER — OFFICE VISIT (OUTPATIENT)
Dept: FAMILY MEDICINE CLINIC | Facility: CLINIC | Age: 61
End: 2021-12-13

## 2021-12-13 VITALS
HEART RATE: 109 BPM | BODY MASS INDEX: 31.83 KG/M2 | SYSTOLIC BLOOD PRESSURE: 130 MMHG | HEIGHT: 62 IN | TEMPERATURE: 97.8 F | WEIGHT: 173 LBS | DIASTOLIC BLOOD PRESSURE: 86 MMHG | OXYGEN SATURATION: 95 %

## 2021-12-13 DIAGNOSIS — K80.20 GALLSTONES: ICD-10-CM

## 2021-12-13 DIAGNOSIS — R11.0 NAUSEA: Primary | ICD-10-CM

## 2021-12-13 LAB
ALBUMIN SERPL-MCNC: 4.2 G/DL (ref 3.5–5.2)
ALBUMIN/GLOB SERPL: 1.4 G/DL
ALP SERPL-CCNC: 78 U/L (ref 39–117)
ALT SERPL W P-5'-P-CCNC: 30 U/L (ref 1–33)
ANION GAP SERPL CALCULATED.3IONS-SCNC: 12.8 MMOL/L (ref 5–15)
AST SERPL-CCNC: 33 U/L (ref 1–32)
BACTERIA UR QL AUTO: ABNORMAL /HPF
BILIRUB SERPL-MCNC: 0.2 MG/DL (ref 0–1.2)
BILIRUB UR QL STRIP: NEGATIVE
BUN SERPL-MCNC: 12 MG/DL (ref 6–20)
BUN/CREAT SERPL: 21.4 (ref 7–25)
CALCIUM SPEC-SCNC: 8.9 MG/DL (ref 8.6–10.5)
CHLORIDE SERPL-SCNC: 100 MMOL/L (ref 98–107)
CLARITY UR: CLEAR
CO2 SERPL-SCNC: 25.2 MMOL/L (ref 22–29)
COLOR UR: YELLOW
CREAT SERPL-MCNC: 0.56 MG/DL (ref 0.57–1)
ERYTHROCYTE [DISTWIDTH] IN BLOOD BY AUTOMATED COUNT: 14.1 % (ref 12.3–15.4)
GFR SERPL CREATININE-BSD FRML MDRD: 111 ML/MIN/1.73
GLOBULIN UR ELPH-MCNC: 3.1 GM/DL
GLUCOSE SERPL-MCNC: 139 MG/DL (ref 65–99)
GLUCOSE UR STRIP-MCNC: NEGATIVE MG/DL
HCT VFR BLD AUTO: 41.2 % (ref 34–46.6)
HGB BLD-MCNC: 13.7 G/DL (ref 12–15.9)
HGB UR QL STRIP.AUTO: ABNORMAL
HYALINE CASTS UR QL AUTO: ABNORMAL /LPF
KETONES UR QL STRIP: NEGATIVE
LEUKOCYTE ESTERASE UR QL STRIP.AUTO: NEGATIVE
LYMPHOCYTES # BLD AUTO: 2.4 10*3/MM3 (ref 0.7–3.1)
LYMPHOCYTES NFR BLD AUTO: 25 % (ref 19.6–45.3)
MCH RBC QN AUTO: 31.2 PG (ref 26.6–33)
MCHC RBC AUTO-ENTMCNC: 33.4 G/DL (ref 31.5–35.7)
MCV RBC AUTO: 93.4 FL (ref 79–97)
MONOCYTES # BLD AUTO: 0.5 10*3/MM3 (ref 0.1–0.9)
MONOCYTES NFR BLD AUTO: 5 % (ref 5–12)
MUCOUS THREADS URNS QL MICRO: ABNORMAL /HPF
NEUTROPHILS NFR BLD AUTO: 6.8 10*3/MM3 (ref 1.7–7)
NEUTROPHILS NFR BLD AUTO: 70 % (ref 42.7–76)
NITRITE UR QL STRIP: NEGATIVE
PH UR STRIP.AUTO: 8 [PH] (ref 4.6–8)
PLATELET # BLD AUTO: 257 10*3/MM3 (ref 140–450)
PMV BLD AUTO: 7.2 FL (ref 6–12)
POTASSIUM SERPL-SCNC: 4.4 MMOL/L (ref 3.5–5.2)
PROT SERPL-MCNC: 7.3 G/DL (ref 6–8.5)
PROT UR QL STRIP: ABNORMAL
RBC # BLD AUTO: 4.41 10*6/MM3 (ref 3.77–5.28)
RBC # UR STRIP: ABNORMAL /HPF
REF LAB TEST METHOD: ABNORMAL
SODIUM SERPL-SCNC: 138 MMOL/L (ref 136–145)
SP GR UR STRIP: 1.01 (ref 1–1.03)
SQUAMOUS #/AREA URNS HPF: ABNORMAL /HPF
UROBILINOGEN UR QL STRIP: ABNORMAL
WBC # UR STRIP: ABNORMAL /HPF
WBC NRBC COR # BLD: 9.7 10*3/MM3 (ref 3.4–10.8)

## 2021-12-13 PROCEDURE — 36415 COLL VENOUS BLD VENIPUNCTURE: CPT | Performed by: INTERNAL MEDICINE

## 2021-12-13 PROCEDURE — 99213 OFFICE O/P EST LOW 20 MIN: CPT | Performed by: INTERNAL MEDICINE

## 2021-12-13 PROCEDURE — 85025 COMPLETE CBC W/AUTO DIFF WBC: CPT | Performed by: INTERNAL MEDICINE

## 2021-12-13 PROCEDURE — 80053 COMPREHEN METABOLIC PANEL: CPT | Performed by: INTERNAL MEDICINE

## 2021-12-13 PROCEDURE — 81001 URINALYSIS AUTO W/SCOPE: CPT | Performed by: INTERNAL MEDICINE

## 2021-12-13 RX ORDER — PROMETHAZINE HYDROCHLORIDE 25 MG/1
25 TABLET ORAL EVERY 8 HOURS PRN
Qty: 30 TABLET | Refills: 3 | Status: SHIPPED | OUTPATIENT
Start: 2021-12-13 | End: 2022-03-30

## 2021-12-13 RX ORDER — NITROFURANTOIN 25; 75 MG/1; MG/1
100 CAPSULE ORAL 2 TIMES DAILY
Qty: 14 CAPSULE | Refills: 0 | Status: SHIPPED | OUTPATIENT
Start: 2021-12-13 | End: 2021-12-29 | Stop reason: HOSPADM

## 2021-12-13 NOTE — PROGRESS NOTES
Subjective   Glenis Bernardo is a 61 y.o. female.     Vitals:    12/13/21 1422   BP: 130/86   Pulse: 109   Temp: 97.8 °F (36.6 °C)   SpO2: 95%      Body mass index is 31.63 kg/m².     History of Present Illness   Patient was seen for nausea.  Patient ate at [facility] and  indie.  Patient got beef at  [employer] and chicken wings at [facility].  Patient  also had chicken wings and also was nauseated.  Patient does have a history of gallstones near the neck of the gallbladder.  Patient is having surgical evaluation.  Patient was placed on Theragran with labs drawn.  Patient will follow up in 1 week.    Dictated utilizing Dragon dictation. If there are questions or for further clarification, please contact me.  The following portions of the patient's history were reviewed and updated as appropriate: allergies, current medications, past family history, past medical history, past social history, past surgical history and problem list.    Review of Systems   Constitutional: Negative for fatigue and fever.   HENT: Positive for congestion. Negative for trouble swallowing.    Eyes: Negative for discharge and visual disturbance.   Respiratory: Negative for choking and shortness of breath.    Cardiovascular: Negative for chest pain and palpitations.   Gastrointestinal: Positive for nausea. Negative for abdominal pain, blood in stool, diarrhea and vomiting.   Endocrine: Negative.    Genitourinary: Negative for genital sores and hematuria.   Musculoskeletal: Negative for gait problem and joint swelling.   Skin: Negative for color change, pallor, rash and wound.   Allergic/Immunologic: Positive for environmental allergies. Negative for immunocompromised state.   Neurological: Negative for facial asymmetry and speech difficulty.   Psychiatric/Behavioral: Negative for hallucinations and suicidal ideas.       Objective   Physical Exam  Vitals and nursing note reviewed.   Constitutional:       Appearance: Normal appearance. She is  well-developed.   HENT:      Head: Normocephalic and atraumatic.      Nose: Nose normal.      Mouth/Throat:      Mouth: Mucous membranes are moist.      Pharynx: Oropharynx is clear.   Eyes:      Extraocular Movements: Extraocular movements intact.      Conjunctiva/sclera: Conjunctivae normal.      Pupils: Pupils are equal, round, and reactive to light.   Cardiovascular:      Rate and Rhythm: Normal rate and regular rhythm.      Heart sounds: Normal heart sounds. No murmur heard.  No friction rub. No gallop.    Pulmonary:      Effort: Pulmonary effort is normal. No respiratory distress.      Breath sounds: Normal breath sounds. No stridor. No wheezing, rhonchi or rales.   Chest:      Chest wall: No tenderness.   Abdominal:      General: Bowel sounds are normal. There is no distension.      Palpations: Abdomen is soft. There is no mass.      Tenderness: There is no abdominal tenderness. There is no right CVA tenderness, left CVA tenderness, guarding or rebound.      Hernia: No hernia is present.   Musculoskeletal:         General: Normal range of motion.      Cervical back: Normal range of motion and neck supple.   Skin:     General: Skin is warm and dry.   Neurological:      General: No focal deficit present.      Mental Status: She is alert and oriented to person, place, and time. Mental status is at baseline.   Psychiatric:         Mood and Affect: Mood normal.         Behavior: Behavior normal.         Thought Content: Thought content normal.         Judgment: Judgment normal.         Assessment/Plan #1 Phenergan 25 mg p.o. every 8 as needed nausea, do not drive #2 surgical referral for gallstones #3 labs  Problems Addressed this Visit        Gastrointestinal Abdominal     Gallstones    Relevant Orders    Ambulatory Referral to General Surgery      Other Visit Diagnoses     Nausea    -  Primary    Relevant Orders    CBC & Differential    Comprehensive Metabolic Panel    Ambulatory Referral to General Surgery       Diagnoses     Diagnosis Codes Comments    Nausea    -  Primary ICD-10-CM: R11.0  ICD-9-CM: 787.02     Gallstones     ICD-10-CM: K80.20  ICD-9-CM: 574.20

## 2021-12-14 DIAGNOSIS — R31.9 HEMATURIA, UNSPECIFIED TYPE: Primary | ICD-10-CM

## 2021-12-16 LAB
BACTERIA UR CULT: ABNORMAL

## 2021-12-23 ENCOUNTER — OFFICE VISIT (OUTPATIENT)
Dept: SURGERY | Facility: CLINIC | Age: 61
End: 2021-12-23

## 2021-12-23 VITALS — WEIGHT: 173 LBS | BODY MASS INDEX: 31.83 KG/M2 | HEIGHT: 62 IN

## 2021-12-23 DIAGNOSIS — K80.10 CALCULUS OF GALLBLADDER WITH CHRONIC CHOLECYSTITIS WITHOUT OBSTRUCTION: Primary | ICD-10-CM

## 2021-12-23 PROCEDURE — 99203 OFFICE O/P NEW LOW 30 MIN: CPT | Performed by: SURGERY

## 2021-12-23 RX ORDER — CEFAZOLIN SODIUM 2 G/100ML
2 INJECTION, SOLUTION INTRAVENOUS ONCE
Status: CANCELLED | OUTPATIENT
Start: 2021-12-29 | End: 2021-12-23

## 2021-12-23 NOTE — PROGRESS NOTES
Subjective   Glenis Bernardo is a 61 y.o. female who presents to the office in surgical consultation from Skyler Velasquez MD for gallstones.    History of Present Illness     The patient has a several month history of postprandial epigastric discomfort that she describes as a gnawing, gurgling, hunger type pain.  It occurs about 30 minutes after eating.  It is not associated with nausea.  It is worse after eating [employer] or other fast food.  She also has frequent diarrhea.  A right upper quadrant ultrasound was performed that showed gallstones.    Review of Systems   Constitutional: Negative for fatigue and fever.   HENT: Negative for trouble swallowing and voice change.    Respiratory: Negative for chest tightness and shortness of breath.    Cardiovascular: Negative for chest pain and palpitations.   Gastrointestinal: Positive for abdominal pain. Negative for blood in stool, constipation, diarrhea, nausea and vomiting.   Psychiatric/Behavioral: Negative for agitation and confusion.     Past Medical History:   Diagnosis Date   • Anxiety    • Choroid melanoma of left eye (HCC)    • Diverticulosis    • Fatty liver    • Gallstones    • H/O bladder infections    • History of colon polyps    • History of radiation therapy    • Hyperlipidemia    • LUQ pain      Past Surgical History:   Procedure Laterality Date   • BREAST BIOPSY Left    • COLONOSCOPY N/A 2/12/2018    Procedure: COLONOSCOPY TO CECUM WITH POLYPECTOMY HOT SNARE, COLD BIOPSY, COLD SNARE;  Surgeon: Juan Espinosa MD;  Location: [facility] ENDOSCOPY;  Service:    • COLONOSCOPY W/ POLYPECTOMY N/A 8/5/2021    Procedure: COLONOSCOPY into cecum;  Surgeon: Juan Espinosa MD;  Location: [facility] ENDOSCOPY;  Service: Gastroenterology;  Laterality: N/A;  diverticulosis   • ENDOSCOPY N/A 8/5/2021    Procedure: ESOPHAGOGASTRODUODENOSCOPY WITH BIOPSY;  Surgeon: Juan Espinosa MD;  Location: [facility] ENDOSCOPY;  Service: Gastroenterology;  Laterality:  N/A;  schatzskis ring, hiatal hernia   • EYE SURGERY       Family History   Problem Relation Age of Onset   • Heart failure Mother    • Diabetes Mother    • Heart failure Father    • Diabetes Sister    • Skin cancer Sister    • Diabetes Brother    • Diabetes Sister    • Breast cancer Sister    • Prostate cancer Brother    • Cirrhosis Sister    • Parkinsonism Brother    • Malig Hyperthermia Neg Hx      Social History     Socioeconomic History   • Marital status:    • Number of children: 1   Tobacco Use   • Smoking status: Former Smoker     Packs/day: 1.00     Years: 35.00     Pack years: 35.00     Types: Electronic Cigarette, Cigarettes     Quit date:      Years since quittin.9   • Smokeless tobacco: Never Used   Vaping Use   • Vaping Use: Former   • Substances: Nicotine, Flavoring   • Devices: Refillable tank   Substance and Sexual Activity   • Alcohol use: Yes     Alcohol/week: 2.0 - 3.0 standard drinks     Types: 2 - 3 Cans of beer per week     Comment: Pt reports drinking beer every night.    • Drug use: No   • Sexual activity: Defer       Objective   Physical Exam  Constitutional:       Appearance: Normal appearance. She is well-developed. She is not toxic-appearing.   Eyes:      General: No scleral icterus.  Pulmonary:      Effort: Pulmonary effort is normal. No respiratory distress.   Abdominal:      Palpations: Abdomen is soft.      Tenderness: There is no abdominal tenderness.   Skin:     General: Skin is warm and dry.   Neurological:      Mental Status: She is alert and oriented to person, place, and time.   Psychiatric:         Behavior: Behavior normal.         Thought Content: Thought content normal.         Judgment: Judgment normal.         Assessment/Plan       The encounter diagnosis was Calculus of gallbladder with chronic cholecystitis without obstruction.    The patient has intermittent postprandial epigastric abdominal discomfort especially after eating fatty foods.  She had a  right upper quadrant ultrasound that showed gallstones.  She has been diagnosed with cholelithiasis and chronic cholecystitis.  She will be scheduled for laparoscopic cholecystectomy with intraoperative cholangiogram.  The patient understands the indications, alternatives, risks, and benefits of the procedure and wishes to proceed.

## 2021-12-27 RX ORDER — PANTOPRAZOLE SODIUM 40 MG/1
TABLET, DELAYED RELEASE ORAL
Qty: 30 TABLET | Refills: 5 | Status: SHIPPED | OUTPATIENT
Start: 2021-12-27 | End: 2022-06-23

## 2021-12-28 ENCOUNTER — LAB (OUTPATIENT)
Dept: LAB | Facility: HOSPITAL | Age: 61
End: 2021-12-28

## 2021-12-28 DIAGNOSIS — K80.10 CALCULUS OF GALLBLADDER WITH CHRONIC CHOLECYSTITIS WITHOUT OBSTRUCTION: ICD-10-CM

## 2021-12-28 LAB — SARS-COV-2 ORF1AB RESP QL NAA+PROBE: NOT DETECTED

## 2021-12-28 PROCEDURE — C9803 HOPD COVID-19 SPEC COLLECT: HCPCS

## 2021-12-28 PROCEDURE — U0004 COV-19 TEST NON-CDC HGH THRU: HCPCS

## 2021-12-29 ENCOUNTER — ANESTHESIA (OUTPATIENT)
Dept: PERIOP | Facility: HOSPITAL | Age: 61
End: 2021-12-29

## 2021-12-29 ENCOUNTER — APPOINTMENT (OUTPATIENT)
Dept: GENERAL RADIOLOGY | Facility: HOSPITAL | Age: 61
End: 2021-12-29

## 2021-12-29 ENCOUNTER — ANESTHESIA EVENT (OUTPATIENT)
Dept: PERIOP | Facility: HOSPITAL | Age: 61
End: 2021-12-29

## 2021-12-29 ENCOUNTER — HOSPITAL ENCOUNTER (OUTPATIENT)
Facility: HOSPITAL | Age: 61
Setting detail: HOSPITAL OUTPATIENT SURGERY
Discharge: HOME | End: 2021-12-29
Attending: SURGERY | Admitting: SURGERY

## 2021-12-29 VITALS
HEIGHT: 62 IN | RESPIRATION RATE: 14 BRPM | WEIGHT: 169.7 LBS | HEART RATE: 86 BPM | SYSTOLIC BLOOD PRESSURE: 108 MMHG | TEMPERATURE: 98.3 F | DIASTOLIC BLOOD PRESSURE: 77 MMHG | BODY MASS INDEX: 31.23 KG/M2 | OXYGEN SATURATION: 96 %

## 2021-12-29 DIAGNOSIS — K80.10 CALCULUS OF GALLBLADDER WITH CHRONIC CHOLECYSTITIS WITHOUT OBSTRUCTION: ICD-10-CM

## 2021-12-29 PROCEDURE — 0 IOTHALAMATE 60 % SOLUTION: Performed by: SURGERY

## 2021-12-29 PROCEDURE — 25010000002 NEOSTIGMINE 5 MG/10ML SOLUTION: Performed by: NURSE ANESTHETIST, CERTIFIED REGISTERED

## 2021-12-29 PROCEDURE — 25010000002 PROPOFOL 10 MG/ML EMULSION: Performed by: NURSE ANESTHETIST, CERTIFIED REGISTERED

## 2021-12-29 PROCEDURE — 25010000002 DEXAMETHASONE PER 1 MG: Performed by: NURSE ANESTHETIST, CERTIFIED REGISTERED

## 2021-12-29 PROCEDURE — C1889 IMPLANT/INSERT DEVICE, NOC: HCPCS | Performed by: SURGERY

## 2021-12-29 PROCEDURE — 47563 LAPARO CHOLECYSTECTOMY/GRAPH: CPT | Performed by: SURGERY

## 2021-12-29 PROCEDURE — 74300 X-RAY BILE DUCTS/PANCREAS: CPT

## 2021-12-29 PROCEDURE — 47563 LAPARO CHOLECYSTECTOMY/GRAPH: CPT | Performed by: SPECIALIST/TECHNOLOGIST, OTHER

## 2021-12-29 PROCEDURE — 25010000002 FENTANYL CITRATE (PF) 50 MCG/ML SOLUTION: Performed by: NURSE ANESTHETIST, CERTIFIED REGISTERED

## 2021-12-29 PROCEDURE — 88304 TISSUE EXAM BY PATHOLOGIST: CPT | Performed by: SURGERY

## 2021-12-29 PROCEDURE — 0 CEFAZOLIN IN DEXTROSE 2-4 GM/100ML-% SOLUTION: Performed by: SURGERY

## 2021-12-29 PROCEDURE — 25010000002 ONDANSETRON PER 1 MG: Performed by: NURSE ANESTHETIST, CERTIFIED REGISTERED

## 2021-12-29 PROCEDURE — 25010000002 HYDROMORPHONE PER 4 MG: Performed by: NURSE ANESTHETIST, CERTIFIED REGISTERED

## 2021-12-29 DEVICE — CLIP LIGAT VASC HORIZON TI MD/LG GRN 6CT: Type: IMPLANTABLE DEVICE | Site: ABDOMEN | Status: FUNCTIONAL

## 2021-12-29 RX ORDER — ONDANSETRON 2 MG/ML
4 INJECTION INTRAMUSCULAR; INTRAVENOUS ONCE AS NEEDED
Status: DISCONTINUED | OUTPATIENT
Start: 2021-12-29 | End: 2021-12-29 | Stop reason: HOSPADM

## 2021-12-29 RX ORDER — DIPHENHYDRAMINE HYDROCHLORIDE 50 MG/ML
12.5 INJECTION INTRAMUSCULAR; INTRAVENOUS
Status: DISCONTINUED | OUTPATIENT
Start: 2021-12-29 | End: 2021-12-29 | Stop reason: HOSPADM

## 2021-12-29 RX ORDER — NEOSTIGMINE METHYLSULFATE 0.5 MG/ML
INJECTION, SOLUTION INTRAVENOUS AS NEEDED
Status: DISCONTINUED | OUTPATIENT
Start: 2021-12-29 | End: 2021-12-29 | Stop reason: SURG

## 2021-12-29 RX ORDER — HYDRALAZINE HYDROCHLORIDE 20 MG/ML
5 INJECTION INTRAMUSCULAR; INTRAVENOUS
Status: DISCONTINUED | OUTPATIENT
Start: 2021-12-29 | End: 2021-12-29 | Stop reason: HOSPADM

## 2021-12-29 RX ORDER — ONDANSETRON 4 MG/1
4 TABLET, FILM COATED ORAL EVERY 6 HOURS PRN
Qty: 10 TABLET | Refills: 0 | Status: SHIPPED | OUTPATIENT
Start: 2021-12-29 | End: 2022-01-11

## 2021-12-29 RX ORDER — LIDOCAINE HYDROCHLORIDE 20 MG/ML
INJECTION, SOLUTION INFILTRATION; PERINEURAL AS NEEDED
Status: DISCONTINUED | OUTPATIENT
Start: 2021-12-29 | End: 2021-12-29 | Stop reason: SURG

## 2021-12-29 RX ORDER — FENTANYL CITRATE 50 UG/ML
INJECTION, SOLUTION INTRAMUSCULAR; INTRAVENOUS AS NEEDED
Status: DISCONTINUED | OUTPATIENT
Start: 2021-12-29 | End: 2021-12-29 | Stop reason: SURG

## 2021-12-29 RX ORDER — FAMOTIDINE 10 MG/ML
20 INJECTION, SOLUTION INTRAVENOUS ONCE
Status: COMPLETED | OUTPATIENT
Start: 2021-12-29 | End: 2021-12-29

## 2021-12-29 RX ORDER — EPHEDRINE SULFATE 50 MG/ML
INJECTION, SOLUTION INTRAVENOUS AS NEEDED
Status: DISCONTINUED | OUTPATIENT
Start: 2021-12-29 | End: 2021-12-29 | Stop reason: SURG

## 2021-12-29 RX ORDER — SODIUM CHLORIDE, SODIUM LACTATE, POTASSIUM CHLORIDE, CALCIUM CHLORIDE 600; 310; 30; 20 MG/100ML; MG/100ML; MG/100ML; MG/100ML
9 INJECTION, SOLUTION INTRAVENOUS CONTINUOUS
Status: DISCONTINUED | OUTPATIENT
Start: 2021-12-29 | End: 2021-12-29 | Stop reason: HOSPADM

## 2021-12-29 RX ORDER — NALOXONE HCL 0.4 MG/ML
0.2 VIAL (ML) INJECTION AS NEEDED
Status: DISCONTINUED | OUTPATIENT
Start: 2021-12-29 | End: 2021-12-29 | Stop reason: HOSPADM

## 2021-12-29 RX ORDER — HYDROMORPHONE HYDROCHLORIDE 1 MG/ML
0.5 INJECTION, SOLUTION INTRAMUSCULAR; INTRAVENOUS; SUBCUTANEOUS
Status: DISCONTINUED | OUTPATIENT
Start: 2021-12-29 | End: 2021-12-29 | Stop reason: HOSPADM

## 2021-12-29 RX ORDER — PROPOFOL 10 MG/ML
VIAL (ML) INTRAVENOUS AS NEEDED
Status: DISCONTINUED | OUTPATIENT
Start: 2021-12-29 | End: 2021-12-29 | Stop reason: SURG

## 2021-12-29 RX ORDER — LIDOCAINE HYDROCHLORIDE 10 MG/ML
0.5 INJECTION, SOLUTION EPIDURAL; INFILTRATION; INTRACAUDAL; PERINEURAL ONCE AS NEEDED
Status: DISCONTINUED | OUTPATIENT
Start: 2021-12-29 | End: 2021-12-29 | Stop reason: HOSPADM

## 2021-12-29 RX ORDER — MIDAZOLAM HYDROCHLORIDE 1 MG/ML
1 INJECTION INTRAMUSCULAR; INTRAVENOUS
Status: DISCONTINUED | OUTPATIENT
Start: 2021-12-29 | End: 2021-12-29 | Stop reason: HOSPADM

## 2021-12-29 RX ORDER — GLYCOPYRROLATE 0.2 MG/ML
INJECTION INTRAMUSCULAR; INTRAVENOUS AS NEEDED
Status: DISCONTINUED | OUTPATIENT
Start: 2021-12-29 | End: 2021-12-29 | Stop reason: SURG

## 2021-12-29 RX ORDER — EPHEDRINE SULFATE 50 MG/ML
5 INJECTION, SOLUTION INTRAVENOUS ONCE AS NEEDED
Status: DISCONTINUED | OUTPATIENT
Start: 2021-12-29 | End: 2021-12-29 | Stop reason: HOSPADM

## 2021-12-29 RX ORDER — SODIUM CHLORIDE 0.9 % (FLUSH) 0.9 %
3 SYRINGE (ML) INJECTION EVERY 12 HOURS SCHEDULED
Status: DISCONTINUED | OUTPATIENT
Start: 2021-12-29 | End: 2021-12-29 | Stop reason: HOSPADM

## 2021-12-29 RX ORDER — BUPIVACAINE HYDROCHLORIDE AND EPINEPHRINE 5; 5 MG/ML; UG/ML
INJECTION, SOLUTION EPIDURAL; INTRACAUDAL; PERINEURAL AS NEEDED
Status: DISCONTINUED | OUTPATIENT
Start: 2021-12-29 | End: 2021-12-29 | Stop reason: HOSPADM

## 2021-12-29 RX ORDER — DIPHENHYDRAMINE HCL 25 MG
25 CAPSULE ORAL
Status: DISCONTINUED | OUTPATIENT
Start: 2021-12-29 | End: 2021-12-29 | Stop reason: HOSPADM

## 2021-12-29 RX ORDER — SODIUM CHLORIDE 9 MG/ML
INJECTION, SOLUTION INTRAVENOUS AS NEEDED
Status: DISCONTINUED | OUTPATIENT
Start: 2021-12-29 | End: 2021-12-29 | Stop reason: HOSPADM

## 2021-12-29 RX ORDER — HYDROCODONE BITARTRATE AND ACETAMINOPHEN 7.5; 325 MG/1; MG/1
1 TABLET ORAL ONCE AS NEEDED
Status: COMPLETED | OUTPATIENT
Start: 2021-12-29 | End: 2021-12-29

## 2021-12-29 RX ORDER — CEFAZOLIN SODIUM 2 G/100ML
2 INJECTION, SOLUTION INTRAVENOUS ONCE
Status: COMPLETED | OUTPATIENT
Start: 2021-12-29 | End: 2021-12-29

## 2021-12-29 RX ORDER — FENTANYL CITRATE 50 UG/ML
50 INJECTION, SOLUTION INTRAMUSCULAR; INTRAVENOUS
Status: DISCONTINUED | OUTPATIENT
Start: 2021-12-29 | End: 2021-12-29 | Stop reason: HOSPADM

## 2021-12-29 RX ORDER — PROMETHAZINE HYDROCHLORIDE 25 MG/1
25 TABLET ORAL ONCE AS NEEDED
Status: DISCONTINUED | OUTPATIENT
Start: 2021-12-29 | End: 2021-12-29 | Stop reason: HOSPADM

## 2021-12-29 RX ORDER — ROCURONIUM BROMIDE 10 MG/ML
INJECTION, SOLUTION INTRAVENOUS AS NEEDED
Status: DISCONTINUED | OUTPATIENT
Start: 2021-12-29 | End: 2021-12-29 | Stop reason: SURG

## 2021-12-29 RX ORDER — OXYCODONE AND ACETAMINOPHEN 7.5; 325 MG/1; MG/1
2 TABLET ORAL EVERY 4 HOURS PRN
Status: DISCONTINUED | OUTPATIENT
Start: 2021-12-29 | End: 2021-12-29 | Stop reason: HOSPADM

## 2021-12-29 RX ORDER — FLUMAZENIL 0.1 MG/ML
0.2 INJECTION INTRAVENOUS AS NEEDED
Status: DISCONTINUED | OUTPATIENT
Start: 2021-12-29 | End: 2021-12-29 | Stop reason: HOSPADM

## 2021-12-29 RX ORDER — PROMETHAZINE HYDROCHLORIDE 25 MG/1
25 SUPPOSITORY RECTAL ONCE AS NEEDED
Status: DISCONTINUED | OUTPATIENT
Start: 2021-12-29 | End: 2021-12-29 | Stop reason: HOSPADM

## 2021-12-29 RX ORDER — DEXAMETHASONE SODIUM PHOSPHATE 10 MG/ML
INJECTION INTRAMUSCULAR; INTRAVENOUS AS NEEDED
Status: DISCONTINUED | OUTPATIENT
Start: 2021-12-29 | End: 2021-12-29 | Stop reason: SURG

## 2021-12-29 RX ORDER — HYDROMORPHONE HCL 110MG/55ML
PATIENT CONTROLLED ANALGESIA SYRINGE INTRAVENOUS AS NEEDED
Status: DISCONTINUED | OUTPATIENT
Start: 2021-12-29 | End: 2021-12-29 | Stop reason: SURG

## 2021-12-29 RX ORDER — OXYCODONE HYDROCHLORIDE AND ACETAMINOPHEN 5; 325 MG/1; MG/1
TABLET ORAL
Qty: 20 TABLET | Refills: 0 | Status: SHIPPED | OUTPATIENT
Start: 2021-12-29 | End: 2022-01-11

## 2021-12-29 RX ORDER — ONDANSETRON 2 MG/ML
INJECTION INTRAMUSCULAR; INTRAVENOUS AS NEEDED
Status: DISCONTINUED | OUTPATIENT
Start: 2021-12-29 | End: 2021-12-29 | Stop reason: SURG

## 2021-12-29 RX ORDER — SODIUM CHLORIDE 0.9 % (FLUSH) 0.9 %
3-10 SYRINGE (ML) INJECTION AS NEEDED
Status: DISCONTINUED | OUTPATIENT
Start: 2021-12-29 | End: 2021-12-29 | Stop reason: HOSPADM

## 2021-12-29 RX ORDER — LABETALOL HYDROCHLORIDE 5 MG/ML
5 INJECTION, SOLUTION INTRAVENOUS
Status: DISCONTINUED | OUTPATIENT
Start: 2021-12-29 | End: 2021-12-29 | Stop reason: HOSPADM

## 2021-12-29 RX ADMIN — GLYCOPYRROLATE 0.2 MG: 0.2 INJECTION INTRAMUSCULAR; INTRAVENOUS at 14:18

## 2021-12-29 RX ADMIN — NEOSTIGMINE METHYLSULFATE 2.5 MG: 0.5 INJECTION INTRAVENOUS at 14:36

## 2021-12-29 RX ADMIN — DEXAMETHASONE SODIUM PHOSPHATE 8 MG: 10 INJECTION INTRAMUSCULAR; INTRAVENOUS at 14:00

## 2021-12-29 RX ADMIN — ROCURONIUM BROMIDE 40 MG: 50 INJECTION INTRAVENOUS at 13:56

## 2021-12-29 RX ADMIN — FAMOTIDINE 20 MG: 10 INJECTION INTRAVENOUS at 13:39

## 2021-12-29 RX ADMIN — ONDANSETRON 4 MG: 2 INJECTION INTRAMUSCULAR; INTRAVENOUS at 14:39

## 2021-12-29 RX ADMIN — PROPOFOL 200 MG: 10 INJECTION, EMULSION INTRAVENOUS at 13:56

## 2021-12-29 RX ADMIN — FENTANYL CITRATE 25 MCG: 0.05 INJECTION, SOLUTION INTRAMUSCULAR; INTRAVENOUS at 14:14

## 2021-12-29 RX ADMIN — CEFAZOLIN SODIUM 2 G: 2 INJECTION, SOLUTION INTRAVENOUS at 13:42

## 2021-12-29 RX ADMIN — FENTANYL CITRATE 50 MCG: 0.05 INJECTION, SOLUTION INTRAMUSCULAR; INTRAVENOUS at 13:56

## 2021-12-29 RX ADMIN — GLYCOPYRROLATE 0.2 MG: 0.2 INJECTION INTRAMUSCULAR; INTRAVENOUS at 14:15

## 2021-12-29 RX ADMIN — FENTANYL CITRATE 25 MCG: 0.05 INJECTION, SOLUTION INTRAMUSCULAR; INTRAVENOUS at 14:02

## 2021-12-29 RX ADMIN — HYDROCODONE BITARTRATE AND ACETAMINOPHEN 1 TABLET: 7.5; 325 TABLET ORAL at 15:18

## 2021-12-29 RX ADMIN — EPHEDRINE SULFATE 10 MG: 50 INJECTION INTRAVENOUS at 14:19

## 2021-12-29 RX ADMIN — LIDOCAINE HYDROCHLORIDE 80 MG: 20 INJECTION, SOLUTION INFILTRATION; PERINEURAL at 13:56

## 2021-12-29 RX ADMIN — SODIUM CHLORIDE, POTASSIUM CHLORIDE, SODIUM LACTATE AND CALCIUM CHLORIDE 9 ML/HR: 600; 310; 30; 20 INJECTION, SOLUTION INTRAVENOUS at 13:39

## 2021-12-29 RX ADMIN — HYDROMORPHONE HYDROCHLORIDE 0.5 MG: 2 INJECTION, SOLUTION INTRAMUSCULAR; INTRAVENOUS; SUBCUTANEOUS at 14:27

## 2021-12-29 RX ADMIN — HYDROMORPHONE HYDROCHLORIDE 0.5 MG: 2 INJECTION, SOLUTION INTRAMUSCULAR; INTRAVENOUS; SUBCUTANEOUS at 14:39

## 2021-12-29 NOTE — ANESTHESIA PREPROCEDURE EVALUATION
Anesthesia Evaluation     history of anesthetic complications (pt reports awareness with previous sedation anesthetics, I explanained that sometimes occurs with MAC, and is extremely unlikely but still possible with GA):               Airway   Mallampati: III  TM distance: <3 FB  Neck ROM: full  Difficult intubation highly probable and Anterior  Dental    (+) poor dentition        Pulmonary - normal exam     ROS comment: Seasonal allergies   Cardiovascular - normal exam  Exercise tolerance: good (4-7 METS)    ECG reviewed    (+) dysrhythmias (recent holter monitor showed several incidences of nonsustained SVT, no treatment per patient ), hyperlipidemia,       Neuro/Psych  (+) psychiatric history Anxiety,     GI/Hepatic/Renal/Endo    (+)  GERD well controlled,  liver disease fatty liver disease,     ROS Comment: Gallstones, denies nausea or vomiting     Musculoskeletal     Abdominal    Substance History      OB/GYN          Other      history of cancer remission                    Anesthesia Plan    ASA 3     general   (CMAC, looks very anterior )  intravenous induction     Anesthetic plan, all risks, benefits, and alternatives have been provided, discussed and informed consent has been obtained with: patient.

## 2021-12-29 NOTE — ANESTHESIA POSTPROCEDURE EVALUATION
"Patient: Glenis Bernardo    Procedure Summary     Date: 12/29/21 Room / Location: [facility] OR  / [facility] MAIN OR    Anesthesia Start: 1347 Anesthesia Stop: 1455    Procedure: CHOLECYSTECTOMY LAPAROSCOPIC INTRAOPERATIVE CHOLANGIOGRAM (N/A Abdomen) Diagnosis:       Calculus of gallbladder with chronic cholecystitis without obstruction      (Calculus of gallbladder with chronic cholecystitis without obstruction [K80.10])    Surgeons: Tenzin Rivera Jr., MD Provider: Sukhwinder Byrne MD    Anesthesia Type: general ASA Status: 3          Anesthesia Type: general    Vitals  Vitals Value Taken Time   /63 12/29/21 1531   Temp 36.8 °C (98.3 °F) 12/29/21 1454   Pulse 77 12/29/21 1544   Resp 16 12/29/21 1500   SpO2 89 % 12/29/21 1544   Vitals shown include unvalidated device data.        Post Anesthesia Care and Evaluation    Patient location during evaluation: bedside  Patient participation: complete - patient participated  Level of consciousness: awake and alert  Pain management: adequate  Airway patency: patent  Anesthetic complications: No anesthetic complications  PONV Status: none  Cardiovascular status: acceptable  Respiratory status: acceptable  Hydration status: acceptable    Comments: /86 (BP Location: Left arm, Patient Position: Lying)   Pulse 69   Temp 36.8 °C (98.3 °F) (Oral)   Resp 16   Ht 157.5 cm (62\")   Wt 77 kg (169 lb 11.2 oz)   SpO2 93%   BMI 31.04 kg/m²         "

## 2021-12-29 NOTE — ANESTHESIA PROCEDURE NOTES
Airway  Urgency: elective    Date/Time: 12/29/2021 1:58 PM  Airway not difficult    General Information and Staff    Patient location during procedure: OR  Anesthesiologist: Sukhwinder Byrne MD  CRNA: Tessa Ruiz CRNA    Indications and Patient Condition  Indications for airway management: airway protection    Preoxygenated: yes  Mask difficulty assessment: 1 - vent by mask    Final Airway Details  Final airway type: endotracheal airway      Successful airway: ETT  Cuffed: yes   Successful intubation technique: video laryngoscopy  Facilitating devices/methods: intubating stylet  Endotracheal tube insertion site: oral  Blade: CMAC  Blade size: D  ETT size (mm): 7.0  Cormack-Lehane Classification: grade IIa - partial view of glottis  Placement verified by: chest auscultation and capnometry   Measured from: lips  ETT/EBT  to lips (cm): 23  Number of attempts at approach: 1  Assessment: lips, teeth, and gum same as pre-op and atraumatic intubation

## 2021-12-31 ENCOUNTER — TELEPHONE (OUTPATIENT)
Dept: SURGERY | Facility: CLINIC | Age: 61
End: 2021-12-31

## 2021-12-31 LAB
LAB AP CASE REPORT: NORMAL
PATH REPORT.FINAL DX SPEC: NORMAL
PATH REPORT.GROSS SPEC: NORMAL

## 2021-12-31 NOTE — TELEPHONE ENCOUNTER
The patient called the answering service today with complaints of tingling in her arms.  She is recovering from a laparoscopic cholecystectomy with intraoperative cholangiogram 2 days ago.  She has been tolerating a diet with only expected abdominal pain.  She is not having any nausea or vomiting but she has not passed flatus nor had a bowel movement.  She did get some MiraLAX to start taking today.  She took a dose of Percocet today and felt like she developed tingling in her arms.  She did not have any weakness.  She thinks it was related specifically to the Percocet.  She was advised to stop the Percocet and try to transition to Tylenol or ibuprofen.  She will contact the answering service again if she has more problems.

## 2022-01-10 RX ORDER — PRAVASTATIN SODIUM 40 MG
TABLET ORAL
Qty: 30 TABLET | Refills: 3 | Status: SHIPPED | OUTPATIENT
Start: 2022-01-10 | End: 2022-05-09

## 2022-01-11 ENCOUNTER — OFFICE VISIT (OUTPATIENT)
Dept: SURGERY | Facility: CLINIC | Age: 62
End: 2022-01-11

## 2022-01-11 VITALS — HEIGHT: 62 IN | WEIGHT: 167.8 LBS | BODY MASS INDEX: 30.88 KG/M2

## 2022-01-11 DIAGNOSIS — Z48.89 POSTOPERATIVE VISIT: Primary | ICD-10-CM

## 2022-01-11 PROCEDURE — 99024 POSTOP FOLLOW-UP VISIT: CPT | Performed by: SURGERY

## 2022-01-11 NOTE — PROGRESS NOTES
Subjective   Glenis Bernardo is a 61 y.o. female who returns to the office after undergoing a laparoscopic cholecystectomy with intraoperative cholangiogram on 12/29/2021.     History of Present Illness     The patient is recovering well with no significant postop symptoms.  She is having no abdominal pain.  She has a good appetite and regular bowel function with a tendency toward diarrhea.  She has used some Imodium.  Her energy level is good.      Review of Systems   Constitutional: Negative for activity change, appetite change, fatigue and fever.   Respiratory: Negative for chest tightness and shortness of breath.    Cardiovascular: Negative for chest pain and palpitations.   Gastrointestinal: Positive for diarrhea. Negative for abdominal pain, constipation and nausea.   Skin: Negative for rash and wound.   Psychiatric/Behavioral: Negative for agitation and confusion.       Objective   Physical Exam  Constitutional:       General: She is not in acute distress.     Appearance: Normal appearance. She is not ill-appearing or toxic-appearing.   Pulmonary:      Effort: Pulmonary effort is normal. No respiratory distress.   Abdominal:      Palpations: Abdomen is soft.      Tenderness: There is no abdominal tenderness.   Skin:     Comments: Incision: intact with no evidence of infection.   Neurological:      Mental Status: She is alert.   Psychiatric:         Behavior: Behavior normal.         Assessment/Plan   The encounter diagnosis was Postoperative visit.    The patient is recovering well from her laparoscopic cholecystectomy with intraoperative cholangiogram.  She is having some mild diarrhea and this was discussed with her.  She was advised to continue to use the Imodium as needed.  If the diarrhea persists more than 6 weeks or if it worsens, she will contact our office for a trial of cholestyramine.  Otherwise, she will follow-up on an as-needed basis.

## 2022-02-04 DIAGNOSIS — F41.9 ANXIETY: ICD-10-CM

## 2022-02-04 PROBLEM — D25.1 FIBROIDS, INTRAMURAL: Status: ACTIVE | Noted: 2022-02-04

## 2022-02-07 RX ORDER — LORAZEPAM 1 MG/1
TABLET ORAL
Qty: 60 TABLET | Refills: 3 | Status: SHIPPED | OUTPATIENT
Start: 2022-02-07 | End: 2022-03-10

## 2022-03-10 DIAGNOSIS — F41.9 ANXIETY: ICD-10-CM

## 2022-03-10 RX ORDER — LORAZEPAM 1 MG/1
TABLET ORAL
Qty: 60 TABLET | Refills: 3 | Status: SHIPPED | OUTPATIENT
Start: 2022-03-10 | End: 2022-06-28 | Stop reason: SDUPTHER

## 2022-03-29 ENCOUNTER — OFFICE VISIT (OUTPATIENT)
Dept: GASTROENTEROLOGY | Facility: CLINIC | Age: 62
End: 2022-03-29

## 2022-03-29 VITALS
TEMPERATURE: 96.9 F | HEIGHT: 62 IN | OXYGEN SATURATION: 97 % | WEIGHT: 169.7 LBS | BODY MASS INDEX: 31.23 KG/M2 | HEART RATE: 107 BPM

## 2022-03-29 DIAGNOSIS — K80.10 CALCULUS OF GALLBLADDER WITH CHRONIC CHOLECYSTITIS WITHOUT OBSTRUCTION: Primary | ICD-10-CM

## 2022-03-29 DIAGNOSIS — K52.9 CHRONIC DIARRHEA: ICD-10-CM

## 2022-03-29 DIAGNOSIS — D12.6 ADENOMATOUS POLYP OF COLON, UNSPECIFIED PART OF COLON: ICD-10-CM

## 2022-03-29 PROCEDURE — 99214 OFFICE O/P EST MOD 30 MIN: CPT | Performed by: INTERNAL MEDICINE

## 2022-03-29 RX ORDER — LOPERAMIDE HYDROCHLORIDE 2 MG/1
2 CAPSULE ORAL 4 TIMES DAILY PRN
COMMUNITY

## 2022-03-29 RX ORDER — CHOLESTYRAMINE 4 G/9G
4 POWDER, FOR SUSPENSION ORAL 2 TIMES DAILY
Qty: 378 G | Refills: 3 | Status: SHIPPED | OUTPATIENT
Start: 2022-03-29 | End: 2022-03-30

## 2022-03-29 NOTE — PROGRESS NOTES
Chief Complaint   Patient presents with   • Diarrhea     Subjective     HPI  Glenis Bernardo is a 61 y.o. female who presents today for office follow up.  She has been evaluated recently for upper abdominal pain and nausea.  She had an EGD last year which showed a small hiatal hernia but was otherwise unremarkable.  She had a colonoscopy done for history of colon polyps which was normal.  She is status post cholecystectomy with Dr. Rivera in January of this year.  She reports that this has not seemed to have resolved her above-noted symptoms.  She is also had some worsening of chronic diarrhea.  She has taken Imodium as needed but that has has not been very helpful.  Dr. Rivera had recommended that she call the office to consider a trial of cholestyramine but has not yet done that.  Does remain on pantoprazole daily    Objective   Vitals:    03/29/22 1356   Pulse: 107   Temp: 96.9 °F (36.1 °C)   SpO2: 97%       Physical Exam  Vitals reviewed.   Constitutional:       Appearance: She is well-developed.   HENT:      Head: Normocephalic and atraumatic.   Neurological:      Mental Status: She is alert and oriented to person, place, and time.   Psychiatric:         Behavior: Behavior normal.         Thought Content: Thought content normal.         Judgment: Judgment normal.       The following data was reviewed by: Juan Espinosa MD on 03/29/2022:  Common labs    Common Labsle 8/20/21 8/20/21 8/20/21 9/28/21 9/28/21 12/13/21 12/13/21    0946 0946 0946 0952 0952 1541 1541   Glucose   133 (A) 117 (A)   139 (A)   BUN   14 10   12   Creatinine   0.73 0.48 (A)   0.56 (A)   eGFR Non African Am   82 132   111   Sodium   139 140   138   Potassium   5.0 4.7   4.4   Chloride   103 103   100   Calcium   9.4 9.1   8.9   Albumin   4.40 4.20   4.20   Total Bilirubin   0.3 0.3   0.2   Alkaline Phosphatase   72 76   78   AST (SGOT)   20 21   33 (A)   ALT (SGPT)   22 23   30   WBC 9.71     9.70    Hemoglobin 13.5     13.7     Hematocrit 41.3     41.2    Platelets 269     257    Total Cholesterol  225 (A)   185     Triglycerides  72   116     HDL Cholesterol  69 (A)   53     LDL Cholesterol   144 (A)   111 (A)     (A) Abnormal value            Data reviewed: GI studies EGD and colonoscopy and path from 2021     Assessment/Plan   Assessment:     1. Calculus of gallbladder with chronic cholecystitis without obstruction    2. Chronic diarrhea    3. Adenomatous polyp of colon, unspecified part of colon      Plan:   Questran qday, increase to BID 2 weeks if diarrhea not improved  Update health maintenance to reflect need for surveillance colonoscopy in 5 years  Office f/u 3 mos with APC          Juan Espinosa M.D.  [facility], [place]  [address] [place] [address]  Office: [phone]

## 2022-03-30 ENCOUNTER — OFFICE VISIT (OUTPATIENT)
Dept: FAMILY MEDICINE CLINIC | Facility: CLINIC | Age: 62
End: 2022-03-30

## 2022-03-30 VITALS
HEIGHT: 62 IN | OXYGEN SATURATION: 98 % | DIASTOLIC BLOOD PRESSURE: 80 MMHG | SYSTOLIC BLOOD PRESSURE: 130 MMHG | HEART RATE: 98 BPM | BODY MASS INDEX: 29.81 KG/M2 | WEIGHT: 162 LBS

## 2022-03-30 DIAGNOSIS — D22.9 NUMEROUS MOLES: ICD-10-CM

## 2022-03-30 DIAGNOSIS — R31.9 HEMATURIA, UNSPECIFIED TYPE: ICD-10-CM

## 2022-03-30 DIAGNOSIS — M79.671 FOOT PAIN, RIGHT: Primary | ICD-10-CM

## 2022-03-30 DIAGNOSIS — E55.9 VITAMIN D DEFICIENCY: ICD-10-CM

## 2022-03-30 DIAGNOSIS — Z13.220 LIPID SCREENING: ICD-10-CM

## 2022-03-30 DIAGNOSIS — M72.2 PLANTAR FASCIITIS: ICD-10-CM

## 2022-03-30 LAB
BACTERIA UR QL AUTO: ABNORMAL /HPF
BILIRUB UR QL STRIP: NEGATIVE
CLARITY UR: CLEAR
COLOR UR: YELLOW
GLUCOSE UR STRIP-MCNC: NEGATIVE MG/DL
HGB UR QL STRIP.AUTO: ABNORMAL
KETONES UR QL STRIP: NEGATIVE
LEUKOCYTE ESTERASE UR QL STRIP.AUTO: NEGATIVE
NITRITE UR QL STRIP: NEGATIVE
PH UR STRIP.AUTO: <=5 [PH] (ref 4.6–8)
PROT UR QL STRIP: NEGATIVE
RBC # UR STRIP: ABNORMAL /HPF
REF LAB TEST METHOD: ABNORMAL
SP GR UR STRIP: >=1.03 (ref 1–1.03)
SQUAMOUS #/AREA URNS HPF: ABNORMAL /HPF
UROBILINOGEN UR QL STRIP: ABNORMAL
WBC # UR STRIP: ABNORMAL /HPF

## 2022-03-30 PROCEDURE — 73620 X-RAY EXAM OF FOOT: CPT | Performed by: INTERNAL MEDICINE

## 2022-03-30 PROCEDURE — 99214 OFFICE O/P EST MOD 30 MIN: CPT | Performed by: INTERNAL MEDICINE

## 2022-03-30 PROCEDURE — 87086 URINE CULTURE/COLONY COUNT: CPT | Performed by: INTERNAL MEDICINE

## 2022-03-30 PROCEDURE — 81001 URINALYSIS AUTO W/SCOPE: CPT | Performed by: INTERNAL MEDICINE

## 2022-03-30 RX ORDER — ACYCLOVIR 50 MG/G
1 CREAM TOPICAL
COMMUNITY
End: 2022-08-18 | Stop reason: SDUPTHER

## 2022-03-30 RX ORDER — CHLORHEXIDINE GLUCONATE 0.12 MG/ML
RINSE ORAL
COMMUNITY
Start: 2022-03-01 | End: 2022-08-18

## 2022-03-30 RX ORDER — NITROFURANTOIN 25; 75 MG/1; MG/1
100 CAPSULE ORAL 2 TIMES DAILY
Qty: 14 CAPSULE | Refills: 0 | Status: SHIPPED | OUTPATIENT
Start: 2022-03-30 | End: 2022-08-18

## 2022-03-30 NOTE — PROGRESS NOTES
Subjective   Glenis Bernardo is a 61 y.o. female.     Vitals:    03/30/22 1255   BP: 130/80   Pulse: 98   SpO2: 98%      Body mass index is 29.63 kg/m².     History of Present Illness   Patient was seen for right foot pain.  Pain is moderate to severe and occurs while walking.  Pain is on the plantar surface of the foot and becomes very difficult to ambulate.  Patient's foot x-ray showed a calcaneal spur and patient is diagnosed with plantar fasciitis.  Patient was instructed not to walk more than half a block and to get shoes that are more supportive.  Patient was also advised to stretch her feet daily and to use iced water bottles daily.  Patient had numerous moles on her face and back and legs and is being sent to dermatology.  Patient has a history of hematuria was worked up by urology and nothing was found.  Patient had a UA C&S minutes pending at the time of dictation.  Patient is having labs drawn and will come back fasting.    X-Ray  Interpretation report in house X-rays that I personally viewed    Relevant Clinical Issues/Diagnoses/Indications: Right foot pain right foot x-ray        Clinical Findings: #1 calcaneal spur #2 degenerative changes          Comparative Data: No previous x-ray          Date of Previous X-ray:    Change on current X-ray:        Dictated utilizing Dragon dictation. If there are questions or for further clarification, please contact me.  The following portions of the patient's history were reviewed and updated as appropriate: allergies, current medications, past family history, past medical history, past social history, past surgical history and problem list.    Review of Systems   Constitutional: Negative for fatigue and fever.   HENT: Positive for congestion. Negative for trouble swallowing.    Eyes: Negative for discharge and visual disturbance.   Respiratory: Negative for choking and shortness of breath.    Cardiovascular: Negative for chest pain and palpitations.    Gastrointestinal: Negative for abdominal pain and blood in stool.   Endocrine: Negative.    Genitourinary: Positive for hematuria. Negative for genital sores.   Musculoskeletal: Positive for joint swelling. Negative for gait problem.   Skin: Negative for color change, pallor, rash and wound.   Allergic/Immunologic: Positive for environmental allergies. Negative for immunocompromised state.   Neurological: Negative for facial asymmetry and speech difficulty.   Psychiatric/Behavioral: Negative for hallucinations and suicidal ideas.       Objective   Physical Exam  Vitals and nursing note reviewed.   Constitutional:       Appearance: Normal appearance. She is well-developed.   HENT:      Head: Normocephalic and atraumatic.      Nose: Nose normal.      Mouth/Throat:      Mouth: Mucous membranes are moist.      Pharynx: Oropharynx is clear.   Eyes:      Extraocular Movements: Extraocular movements intact.      Conjunctiva/sclera: Conjunctivae normal.      Pupils: Pupils are equal, round, and reactive to light.   Cardiovascular:      Rate and Rhythm: Normal rate and regular rhythm.      Heart sounds: Normal heart sounds. No murmur heard.    No friction rub. No gallop.   Pulmonary:      Effort: Pulmonary effort is normal. No respiratory distress.      Breath sounds: Normal breath sounds. No stridor. No wheezing, rhonchi or rales.   Chest:      Chest wall: No tenderness.   Abdominal:      General: Bowel sounds are normal.      Palpations: Abdomen is soft.   Musculoskeletal:         General: Swelling, tenderness and deformity present. Normal range of motion.      Cervical back: Normal range of motion and neck supple.   Skin:     General: Skin is warm and dry.   Neurological:      General: No focal deficit present.      Mental Status: She is alert and oriented to person, place, and time. Mental status is at baseline.   Psychiatric:         Mood and Affect: Mood normal.         Behavior: Behavior normal.         Thought  Content: Thought content normal.         Judgment: Judgment normal.         Assessment/Plan #1 x-ray right foot #2 more foot support #3 ice feet daily, stretch feet daily #4 refer to dermatology #5 UA C&S 6 labs  Problems Addressed this Visit        Musculoskeletal and Injuries    Plantar fasciitis      Other Visit Diagnoses     Foot pain, right    -  Primary    Relevant Orders    XR Foot 2 View Left (Completed)    Hematuria, unspecified type        Relevant Orders    CBC (No Diff)    Comprehensive Metabolic Panel    Lipid Panel    Vitamin D 25 Hydroxy    Urinalysis With Microscopic - Urine, Clean Catch (Completed)    Urine Culture - Urine, Urine, Clean Catch    Vitamin D deficiency        Relevant Orders    CBC (No Diff)    Comprehensive Metabolic Panel    Lipid Panel    Vitamin D 25 Hydroxy    Urinalysis With Microscopic - Urine, Clean Catch (Completed)    Urine Culture - Urine, Urine, Clean Catch    Lipid screening        Relevant Orders    CBC (No Diff)    Comprehensive Metabolic Panel    Lipid Panel    Vitamin D 25 Hydroxy    Urinalysis With Microscopic - Urine, Clean Catch (Completed)    Urine Culture - Urine, Urine, Clean Catch    Numerous moles        Relevant Medications    acyclovir (ZOVIRAX) 5 % cream    Other Relevant Orders    Ambulatory Referral to Dermatology      Diagnoses     Diagnosis Codes Comments    Foot pain, right    -  Primary ICD-10-CM: M79.671  ICD-9-CM: 729.5     Hematuria, unspecified type     ICD-10-CM: R31.9  ICD-9-CM: 599.70     Vitamin D deficiency     ICD-10-CM: E55.9  ICD-9-CM: 268.9     Lipid screening     ICD-10-CM: Z13.220  ICD-9-CM: V77.91     Numerous moles     ICD-10-CM: D22.9  ICD-9-CM: 216.9     Plantar fasciitis     ICD-10-CM: M72.2  ICD-9-CM: 728.71

## 2022-03-31 ENCOUNTER — TELEPHONE (OUTPATIENT)
Dept: FAMILY MEDICINE CLINIC | Facility: CLINIC | Age: 62
End: 2022-03-31

## 2022-03-31 LAB — BACTERIA SPEC AEROBE CULT: NO GROWTH

## 2022-03-31 NOTE — TELEPHONE ENCOUNTER
Caller: Glenis Bernardo    Relationship: Self    Best call back number: [phone]     Caller requesting test results:     What test was performed: URINE TEST    When was the test performed: 03/30/22    Where was the test performed: IN OFFICE    Additional notes: PATIENT CALLING WANTING TO KNOW IF THE RESULTS ARE BACK YET

## 2022-04-25 ENCOUNTER — LAB (OUTPATIENT)
Dept: FAMILY MEDICINE CLINIC | Facility: CLINIC | Age: 62
End: 2022-04-25

## 2022-04-25 DIAGNOSIS — Z13.220 LIPID SCREENING: ICD-10-CM

## 2022-04-25 DIAGNOSIS — E55.9 VITAMIN D DEFICIENCY: ICD-10-CM

## 2022-04-25 DIAGNOSIS — R31.9 HEMATURIA, UNSPECIFIED TYPE: ICD-10-CM

## 2022-04-25 LAB
25(OH)D3 SERPL-MCNC: 44.8 NG/ML (ref 30–100)
ALBUMIN SERPL-MCNC: 4.7 G/DL (ref 3.5–5.2)
ALBUMIN/GLOB SERPL: 1.8 G/DL
ALP SERPL-CCNC: 89 U/L (ref 39–117)
ALT SERPL W P-5'-P-CCNC: 38 U/L (ref 1–33)
ANION GAP SERPL CALCULATED.3IONS-SCNC: 13.9 MMOL/L (ref 5–15)
AST SERPL-CCNC: 31 U/L (ref 1–32)
BILIRUB SERPL-MCNC: 0.5 MG/DL (ref 0–1.2)
BUN SERPL-MCNC: 10 MG/DL (ref 6–20)
BUN/CREAT SERPL: 14.5 (ref 7–25)
CALCIUM SPEC-SCNC: 9.2 MG/DL (ref 8.6–10.5)
CHLORIDE SERPL-SCNC: 106 MMOL/L (ref 98–107)
CHOLEST SERPL-MCNC: 172 MG/DL (ref 0–200)
CO2 SERPL-SCNC: 23.1 MMOL/L (ref 22–29)
CREAT SERPL-MCNC: 0.69 MG/DL (ref 0.57–1)
DEPRECATED RDW RBC AUTO: 45.7 FL (ref 37–54)
EGFRCR SERPLBLD CKD-EPI 2021: 100.1 ML/MIN/1.73
ERYTHROCYTE [DISTWIDTH] IN BLOOD BY AUTOMATED COUNT: 12.5 % (ref 12.3–15.4)
GLOBULIN UR ELPH-MCNC: 2.6 GM/DL
GLUCOSE SERPL-MCNC: 118 MG/DL (ref 65–99)
HCT VFR BLD AUTO: 42.1 % (ref 34–46.6)
HDLC SERPL-MCNC: 61 MG/DL (ref 40–60)
HGB BLD-MCNC: 13.8 G/DL (ref 12–15.9)
LDLC SERPL CALC-MCNC: 94 MG/DL (ref 0–100)
LDLC/HDLC SERPL: 1.52 {RATIO}
MCH RBC QN AUTO: 32 PG (ref 26.6–33)
MCHC RBC AUTO-ENTMCNC: 32.8 G/DL (ref 31.5–35.7)
MCV RBC AUTO: 97.7 FL (ref 79–97)
PLATELET # BLD AUTO: 253 10*3/MM3 (ref 140–450)
PMV BLD AUTO: 10.8 FL (ref 6–12)
POTASSIUM SERPL-SCNC: 4.7 MMOL/L (ref 3.5–5.2)
PROT SERPL-MCNC: 7.3 G/DL (ref 6–8.5)
RBC # BLD AUTO: 4.31 10*6/MM3 (ref 3.77–5.28)
SODIUM SERPL-SCNC: 143 MMOL/L (ref 136–145)
TRIGL SERPL-MCNC: 92 MG/DL (ref 0–150)
VLDLC SERPL-MCNC: 17 MG/DL (ref 5–40)
WBC NRBC COR # BLD: 6.62 10*3/MM3 (ref 3.4–10.8)

## 2022-04-25 PROCEDURE — 85027 COMPLETE CBC AUTOMATED: CPT | Performed by: INTERNAL MEDICINE

## 2022-04-25 PROCEDURE — 82306 VITAMIN D 25 HYDROXY: CPT | Performed by: INTERNAL MEDICINE

## 2022-04-25 PROCEDURE — 80053 COMPREHEN METABOLIC PANEL: CPT | Performed by: INTERNAL MEDICINE

## 2022-04-25 PROCEDURE — 36415 COLL VENOUS BLD VENIPUNCTURE: CPT | Performed by: INTERNAL MEDICINE

## 2022-04-25 PROCEDURE — 80061 LIPID PANEL: CPT | Performed by: INTERNAL MEDICINE

## 2022-04-26 ENCOUNTER — IMMUNIZATION (OUTPATIENT)
Dept: VACCINE CLINIC | Facility: HOSPITAL | Age: 62
End: 2022-04-26

## 2022-04-26 DIAGNOSIS — Z23 NEED FOR VACCINATION: Primary | ICD-10-CM

## 2022-04-26 PROCEDURE — 0054A HC ADM SARSCV2 30MCG TRS-SUCR BOOSTER: CPT | Performed by: INTERNAL MEDICINE

## 2022-04-26 PROCEDURE — 91305 HC SARSCOV2 VAC 30 MCG TRS-SUCR PFIZER: CPT | Performed by: INTERNAL MEDICINE

## 2022-05-09 RX ORDER — PRAVASTATIN SODIUM 40 MG
TABLET ORAL
Qty: 30 TABLET | Refills: 3 | Status: SHIPPED | OUTPATIENT
Start: 2022-05-09 | End: 2022-09-08

## 2022-05-24 RX ORDER — LANOLIN ALCOHOL/MO/W.PET/CERES
CREAM (GRAM) TOPICAL
Qty: 30 CAPSULE | Refills: 3 | Status: SHIPPED | OUTPATIENT
Start: 2022-05-24 | End: 2022-08-18 | Stop reason: SDUPTHER

## 2022-06-23 RX ORDER — PANTOPRAZOLE SODIUM 40 MG/1
TABLET, DELAYED RELEASE ORAL
Qty: 30 TABLET | Refills: 5 | Status: SHIPPED | OUTPATIENT
Start: 2022-06-23 | End: 2022-08-18 | Stop reason: ALTCHOICE

## 2022-06-28 ENCOUNTER — TELEPHONE (OUTPATIENT)
Dept: FAMILY MEDICINE CLINIC | Facility: CLINIC | Age: 62
End: 2022-06-28

## 2022-06-28 DIAGNOSIS — F41.9 ANXIETY: ICD-10-CM

## 2022-06-28 RX ORDER — LORAZEPAM 1 MG/1
1 TABLET ORAL 2 TIMES DAILY PRN
Qty: 60 TABLET | Refills: 3 | Status: SHIPPED | OUTPATIENT
Start: 2022-06-28 | End: 2022-11-08

## 2022-06-28 NOTE — TELEPHONE ENCOUNTER
Patient called to see if if she can get a medication refill of lorazepam first part of July, patients appointment is not until august 30, 2022 with Dr Velasquez for 4 month follow up requesting a call back.

## 2022-07-07 ENCOUNTER — OFFICE VISIT (OUTPATIENT)
Dept: GASTROENTEROLOGY | Facility: CLINIC | Age: 62
End: 2022-07-07

## 2022-07-07 VITALS
SYSTOLIC BLOOD PRESSURE: 117 MMHG | DIASTOLIC BLOOD PRESSURE: 67 MMHG | WEIGHT: 167.4 LBS | TEMPERATURE: 96 F | HEIGHT: 62 IN | HEART RATE: 111 BPM | BODY MASS INDEX: 30.8 KG/M2

## 2022-07-07 DIAGNOSIS — K90.89 BILE SALT-INDUCED DIARRHEA: Primary | ICD-10-CM

## 2022-07-07 PROCEDURE — 99214 OFFICE O/P EST MOD 30 MIN: CPT | Performed by: PHYSICIAN ASSISTANT

## 2022-07-07 RX ORDER — MONTELUKAST SODIUM 4 MG/1
TABLET, CHEWABLE ORAL
Qty: 60 TABLET | Refills: 11 | Status: SHIPPED | OUTPATIENT
Start: 2022-07-07 | End: 2022-08-10

## 2022-07-07 NOTE — PROGRESS NOTES
Chief Complaint  Nausea, Diarrhea, and Abdominal Pain    Subjective        History of Present Illness  Glenis Bernardo is a  61 y.o. female patient of Dr. Espinosa, new to me today, here for follow-up upper abdominal pain, diarrhea and nausea.  She is status post laparoscopic cholecystectomy.  She was last seen by Dr. Espinosa 3/29/2022 and started on Questran.  She is accompanied to today's visit by her .    Her diarrhea is much improved on once daily Questran.  She does report roughly 2-3 bowel movements daily which are loose in nature and often depend on her p.o. intake.  She has not increased her Questran to twice daily due to the fact that she does not like taste.  She is questioning whether or not there is an alternative medication.  She does report taking Imodium as needed in addition to the Questran.  Her abdominal pain and nausea have both improved.    Bidirectional endoscopy from 8/5/2021 diverticulosis sigmoid colon.  She does have a personal history of colon polyps and therefore will need an updated colonoscopy in August 2026.      Past Medical History:   Diagnosis Date   • Anxiety    • Calculus of gallbladder with chronic cholecystitis without obstruction    • Choroid melanoma of left eye (HCC)    • Diverticulosis    • Fatty liver    • Gallstones    • H/O bladder infections    • History of colon polyps    • History of radiation therapy    • Hyperlipidemia    • LUQ pain        Past Surgical History:   Procedure Laterality Date   • BREAST BIOPSY Left    • CHOLECYSTECTOMY WITH INTRAOPERATIVE CHOLANGIOGRAM N/A 12/29/2021    Procedure: CHOLECYSTECTOMY LAPAROSCOPIC INTRAOPERATIVE CHOLANGIOGRAM;  Surgeon: Tenzin Rivera Jr., MD;  Location: [facility] OR;  Service: General;  Laterality: N/A;   • COLONOSCOPY N/A 2/12/2018    Procedure: COLONOSCOPY TO CECUM WITH POLYPECTOMY HOT SNARE, COLD BIOPSY, COLD SNARE;  Surgeon: Juan Espinosa MD;  Location: [facility] ENDOSCOPY;  Service:    • COLONOSCOPY W/  POLYPECTOMY N/A 2021    Procedure: COLONOSCOPY into cecum;  Surgeon: Juan Espinosa MD;  Location:  [facility];  Service: Gastroenterology;  Laterality: N/A;  diverticulosis   • ENDOSCOPY N/A 2021    Procedure: ESOPHAGOGASTRODUODENOSCOPY WITH BIOPSY;  Surgeon: Juan Espinosa MD;  Location:  [facility];  Service: Gastroenterology;  Laterality: N/A;  schatzskis ring, hiatal hernia   • EYE SURGERY         Family History   Problem Relation Age of Onset   • Heart failure Mother    • Diabetes Mother    • Heart failure Father    • Diabetes Sister    • Skin cancer Sister    • Diabetes Brother    • Diabetes Sister    • Breast cancer Sister    • Prostate cancer Brother    • Cirrhosis Sister    • Parkinsonism Brother    • Malig Hyperthermia Neg Hx        Social History     Socioeconomic History   • Marital status:    • Number of children: 1   Tobacco Use   • Smoking status: Former Smoker     Packs/day: 1.00     Years: 35.00     Pack years: 35.00     Types: Electronic Cigarette, Cigarettes     Quit date:      Years since quittin.5   • Smokeless tobacco: Never Used   • Tobacco comment: cig use for 35 years, then e-cig use for 4 years    Vaping Use   • Vaping Use: Former   • Substances: Nicotine, Flavoring   • Devices: Refillable tank   Substance and Sexual Activity   • Alcohol use: Yes     Alcohol/week: 2.0 - 3.0 standard drinks     Types: 2 - 3 Cans of beer per week     Comment: Pt reports drinking beer every night.    • Drug use: No   • Sexual activity: Defer       Allergies   Allergen Reactions   • Percocet [Oxycodone-Acetaminophen] Nausea Only   • Crestor [Rosuvastatin] Myalgia       Current Outpatient Medications on File Prior to Visit   Medication Sig Dispense Refill   • coenzyme Q10 100 MG capsule Take 100 mg by mouth Daily.     • loperamide (IMODIUM) 2 MG capsule Take 2 mg by mouth 4 (Four) Times a Day As Needed for Diarrhea.     • LORazepam (ATIVAN) 1 MG tablet Take 1  "tablet by mouth 2 (Two) Times a Day As Needed for Anxiety. 60 tablet 3   • Olopatadine HCl (PATADAY OP) Apply  to eye(s) as directed by provider Daily. Left eye     • pantoprazole (PROTONIX) 40 MG EC tablet TAKE ONE TABLET BY MOUTH DAILY 30 tablet 5   • pravastatin (PRAVACHOL) 40 MG tablet TAKE ONE TABLET BY MOUTH DAILY 30 tablet 3   • vitamin D3 125 MCG (5000 UT) capsule capsule Take 1 capsule by mouth Daily. 30 capsule 3   • vitamin E 200 UNIT capsule TAKE ONE CAPSULE  BY MOUTH DAILY 30 capsule 3   • acyclovir (ZOVIRAX) 5 % cream Apply 1 application topically to the appropriate area as directed Every 3 (Three) Hours.     • chlorhexidine (PERIDEX) 0.12 % solution SWISH FOR 1 MINUTE AND SPIT TWICE DAILY FOR 10 DAYS     • nitrofurantoin, macrocrystal-monohydrate, (Macrobid) 100 MG capsule Take 1 capsule by mouth 2 (Two) Times a Day. 14 capsule 0   • Vitamin E-200 90 MG (200 UNIT) capsule TAKE ONE CAPSULE  BY MOUTH DAILY 30 capsule 3     No current facility-administered medications on file prior to visit.       Review of Systems   Constitutional: Negative for chills and fever.   HENT: Negative for trouble swallowing.    Respiratory: Negative for cough and shortness of breath.    Cardiovascular: Negative for chest pain and palpitations.   Gastrointestinal: Positive for diarrhea. Negative for abdominal pain, anal bleeding, constipation, nausea, vomiting and GERD.        Objective   Vital Signs:   /67   Pulse 111   Temp 96 °F (35.6 °C)   Ht 157.5 cm (62\")   Wt 75.9 kg (167 lb 6.4 oz)   BMI 30.62 kg/m²       Physical Exam  Vitals and nursing note reviewed.   Constitutional:       General: She is not in acute distress.     Appearance: Normal appearance. She is obese. She is not ill-appearing.   HENT:      Head: Normocephalic and atraumatic.      Right Ear: External ear normal.      Left Ear: External ear normal.   Eyes:      General: No scleral icterus.     Conjunctiva/sclera: Conjunctivae normal.      Pupils: " Pupils are equal, round, and reactive to light.   Cardiovascular:      Rate and Rhythm: Normal rate and regular rhythm.      Heart sounds: Normal heart sounds.   Pulmonary:      Effort: Pulmonary effort is normal.      Breath sounds: Normal breath sounds.   Abdominal:      General: Abdomen is flat. Bowel sounds are normal. There is no distension.      Palpations: Abdomen is soft.      Tenderness: There is no abdominal tenderness. There is no guarding or rebound.   Musculoskeletal:      Cervical back: Normal range of motion and neck supple.   Skin:     General: Skin is warm and dry.   Neurological:      Mental Status: She is alert and oriented to person, place, and time.   Psychiatric:         Mood and Affect: Mood normal.         Behavior: Behavior normal.          Result Review :       Common labs    Common Labsle 9/28/21 9/28/21 12/13/21 12/13/21 4/25/22 4/25/22 4/25/22    0952 [id number] 1541 1541 0959 0959 0959   Glucose 117 (A)   139 (A)  118 (A)    BUN 10   12  10    Creatinine 0.48 (A)   0.56 (A)  0.69    eGFR Non  Am 132   111      Sodium 140   138  143    Potassium 4.7   4.4  4.7    Chloride 103   100  106    Calcium 9.1   8.9  9.2    Albumin 4.20   4.20  4.70    Total Bilirubin 0.3   0.2  0.5    Alkaline Phosphatase 76   78  89    AST (SGOT) 21   33 (A)  31    ALT (SGPT) 23   30  38 (A)    WBC   9.70  6.62     Hemoglobin   13.7  13.8     Hematocrit   41.2  42.1     Platelets   257  253     Total Cholesterol  185     172   Triglycerides  116     92   HDL Cholesterol  53     61 (A)   LDL Cholesterol   111 (A)     94   (A) Abnormal value                                Assessment and Plan    Diagnoses and all orders for this visit:    1. Bile salt-induced diarrhea (Primary)    Other orders  -     colestipol (COLESTID) 1 g tablet; Start with one pill daily and increase to 2 daily if no improvement after 2 weeks  Dispense: 60 tablet; Refill: 11      · Trial 1g Colestid. She may increase to 2g daily if no  improvement in two weeks.  · Surveillance colonoscopy due 8/2026.  · Follow up in 6 months, sooner if necessary.       Follow Up   Return in about 6 months (around 1/7/2023).    Dragon dictation used throughout this note.     PASTOR Zurita

## 2022-08-10 ENCOUNTER — TELEPHONE (OUTPATIENT)
Dept: GASTROENTEROLOGY | Facility: CLINIC | Age: 62
End: 2022-08-10

## 2022-08-10 RX ORDER — CHOLESTYRAMINE 4 G/9G
4 POWDER, FOR SUSPENSION ORAL 2 TIMES DAILY
Qty: 378 G | Refills: 3 | Status: SHIPPED | OUTPATIENT
Start: 2022-08-10 | End: 2022-08-10

## 2022-08-10 RX ORDER — CHOLESTYRAMINE 4 G/9G
4 POWDER, FOR SUSPENSION ORAL 2 TIMES DAILY
Qty: 378 G | Refills: 3 | Status: SHIPPED | OUTPATIENT
Start: 2022-08-10 | End: 2023-02-02

## 2022-08-10 NOTE — TELEPHONE ENCOUNTER
Called pt and spoke with pt's .  He reports that his wife can not swallow the colestipol pill and is wanting to go back on the cholestyramine powder.  He would like the script to go to their [facility].   Advised will send message to Kassy BAUMANN.

## 2022-08-10 NOTE — TELEPHONE ENCOUNTER
Caller: KASHIF JOHNSON    Relationship: SELF  Best call back number: [phone]    Requested Prescriptions: CHOLESTYRAMINE  Requested Prescriptions      No prescriptions requested or ordered in this encounter        Pharmacy where request should be sent:    [place] [id number]  Additional details provided by patient: 2 DAY LEFT WITH MEDICATION    Does the patient have less than a 3 day supply:  [x] Yes  [] No    Dionicio CARDONA Rep   08/10/22 08:09 EDT

## 2022-08-10 NOTE — TELEPHONE ENCOUNTER
Call from spouse.  States need cholestyramine changed from packets to can because goodrx coupon does not cover packets.     Call to [facility] @ [phone] and spoke with Elvi.  Advise change to cans - quantity sufficient.  R&V.

## 2022-08-18 ENCOUNTER — OFFICE VISIT (OUTPATIENT)
Dept: FAMILY MEDICINE CLINIC | Facility: CLINIC | Age: 62
End: 2022-08-18

## 2022-08-18 VITALS
SYSTOLIC BLOOD PRESSURE: 122 MMHG | WEIGHT: 165 LBS | TEMPERATURE: 97.3 F | BODY MASS INDEX: 30.36 KG/M2 | OXYGEN SATURATION: 97 % | HEIGHT: 62 IN | HEART RATE: 99 BPM | DIASTOLIC BLOOD PRESSURE: 80 MMHG

## 2022-08-18 DIAGNOSIS — R30.0 DYSURIA: ICD-10-CM

## 2022-08-18 DIAGNOSIS — Z13.220 LIPID SCREENING: ICD-10-CM

## 2022-08-18 DIAGNOSIS — E78.00 PURE HYPERCHOLESTEROLEMIA: ICD-10-CM

## 2022-08-18 DIAGNOSIS — R10.13 EPIGASTRIC PAIN: Primary | ICD-10-CM

## 2022-08-18 DIAGNOSIS — E55.9 VITAMIN D DEFICIENCY: ICD-10-CM

## 2022-08-18 DIAGNOSIS — R31.9 HEMATURIA, UNSPECIFIED TYPE: Primary | ICD-10-CM

## 2022-08-18 PROBLEM — K59.1 FUNCTIONAL DIARRHEA: Status: ACTIVE | Noted: 2022-08-18

## 2022-08-18 LAB
25(OH)D3 SERPL-MCNC: 48.4 NG/ML (ref 30–100)
ALBUMIN SERPL-MCNC: 4 G/DL (ref 3.5–5.2)
ALBUMIN/GLOB SERPL: 1.3 G/DL
ALP SERPL-CCNC: 125 U/L (ref 39–117)
ALT SERPL W P-5'-P-CCNC: 40 U/L (ref 1–33)
AMYLASE SERPL-CCNC: 78 U/L (ref 28–100)
ANION GAP SERPL CALCULATED.3IONS-SCNC: 16.3 MMOL/L (ref 5–15)
AST SERPL-CCNC: 51 U/L (ref 1–32)
BACTERIA UR QL AUTO: ABNORMAL /HPF
BILIRUB SERPL-MCNC: 0.3 MG/DL (ref 0–1.2)
BILIRUB UR QL STRIP: NEGATIVE
BUN SERPL-MCNC: 7 MG/DL (ref 6–20)
BUN/CREAT SERPL: 12.1 (ref 7–25)
CALCIUM SPEC-SCNC: 9.5 MG/DL (ref 8.6–10.5)
CHLORIDE SERPL-SCNC: 99 MMOL/L (ref 98–107)
CHOLEST SERPL-MCNC: 183 MG/DL (ref 0–200)
CLARITY UR: CLEAR
CO2 SERPL-SCNC: 24.7 MMOL/L (ref 22–29)
COLOR UR: YELLOW
CREAT SERPL-MCNC: 0.58 MG/DL (ref 0.57–1)
DEPRECATED RDW RBC AUTO: 43 FL (ref 37–54)
EGFRCR SERPLBLD CKD-EPI 2021: 104.4 ML/MIN/1.73
ERYTHROCYTE [DISTWIDTH] IN BLOOD BY AUTOMATED COUNT: 12.2 % (ref 12.3–15.4)
GLOBULIN UR ELPH-MCNC: 3.1 GM/DL
GLUCOSE SERPL-MCNC: 104 MG/DL (ref 65–99)
GLUCOSE UR STRIP-MCNC: NEGATIVE MG/DL
HCT VFR BLD AUTO: 40.1 % (ref 34–46.6)
HDLC SERPL-MCNC: 51 MG/DL (ref 40–60)
HGB BLD-MCNC: 13.4 G/DL (ref 12–15.9)
HGB UR QL STRIP.AUTO: ABNORMAL
KETONES UR QL STRIP: ABNORMAL
LDLC SERPL CALC-MCNC: 110 MG/DL (ref 0–100)
LDLC/HDLC SERPL: 2.1 {RATIO}
LEUKOCYTE ESTERASE UR QL STRIP.AUTO: NEGATIVE
MCH RBC QN AUTO: 32.6 PG (ref 26.6–33)
MCHC RBC AUTO-ENTMCNC: 33.4 G/DL (ref 31.5–35.7)
MCV RBC AUTO: 97.6 FL (ref 79–97)
NITRITE UR QL STRIP: NEGATIVE
PH UR STRIP.AUTO: <=5 [PH] (ref 4.6–8)
PLATELET # BLD AUTO: 256 10*3/MM3 (ref 140–450)
PMV BLD AUTO: 10 FL (ref 6–12)
POTASSIUM SERPL-SCNC: 4.2 MMOL/L (ref 3.5–5.2)
PROT SERPL-MCNC: 7.1 G/DL (ref 6–8.5)
PROT UR QL STRIP: ABNORMAL
RBC # BLD AUTO: 4.11 10*6/MM3 (ref 3.77–5.28)
RBC # UR STRIP: ABNORMAL /HPF
REF LAB TEST METHOD: ABNORMAL
SODIUM SERPL-SCNC: 140 MMOL/L (ref 136–145)
SP GR UR STRIP: 1.02 (ref 1–1.03)
SQUAMOUS #/AREA URNS HPF: ABNORMAL /HPF
TRIGL SERPL-MCNC: 125 MG/DL (ref 0–150)
UROBILINOGEN UR QL STRIP: ABNORMAL
VLDLC SERPL-MCNC: 22 MG/DL (ref 5–40)
WBC # UR STRIP: ABNORMAL /HPF
WBC NRBC COR # BLD: 11.11 10*3/MM3 (ref 3.4–10.8)

## 2022-08-18 PROCEDURE — 87086 URINE CULTURE/COLONY COUNT: CPT | Performed by: INTERNAL MEDICINE

## 2022-08-18 PROCEDURE — 99214 OFFICE O/P EST MOD 30 MIN: CPT | Performed by: INTERNAL MEDICINE

## 2022-08-18 PROCEDURE — 82150 ASSAY OF AMYLASE: CPT | Performed by: INTERNAL MEDICINE

## 2022-08-18 PROCEDURE — 36415 COLL VENOUS BLD VENIPUNCTURE: CPT | Performed by: INTERNAL MEDICINE

## 2022-08-18 PROCEDURE — 80061 LIPID PANEL: CPT | Performed by: INTERNAL MEDICINE

## 2022-08-18 PROCEDURE — 85027 COMPLETE CBC AUTOMATED: CPT | Performed by: INTERNAL MEDICINE

## 2022-08-18 PROCEDURE — 80053 COMPREHEN METABOLIC PANEL: CPT | Performed by: INTERNAL MEDICINE

## 2022-08-18 PROCEDURE — 82306 VITAMIN D 25 HYDROXY: CPT | Performed by: INTERNAL MEDICINE

## 2022-08-18 PROCEDURE — 81001 URINALYSIS AUTO W/SCOPE: CPT | Performed by: INTERNAL MEDICINE

## 2022-08-18 RX ORDER — OMEPRAZOLE 40 MG/1
40 CAPSULE, DELAYED RELEASE ORAL DAILY
Qty: 90 CAPSULE | Refills: 1 | Status: SHIPPED | OUTPATIENT
Start: 2022-08-18 | End: 2022-08-30 | Stop reason: ALTCHOICE

## 2022-08-18 RX ORDER — HYOSCYAMINE SULFATE 0.12 MG/1
0.12 TABLET SUBLINGUAL 4 TIMES DAILY PRN
Qty: 30 EACH | Refills: 3 | Status: SHIPPED | OUTPATIENT
Start: 2022-08-18 | End: 2022-08-30

## 2022-08-18 RX ORDER — ACYCLOVIR 50 MG/G
1 CREAM TOPICAL
Qty: 5 G | Refills: 3 | Status: SHIPPED | OUTPATIENT
Start: 2022-08-18 | End: 2022-12-09

## 2022-08-18 RX ORDER — HYOSCYAMINE SULFATE 0.12 MG/1
0.12 TABLET SUBLINGUAL 4 TIMES DAILY PRN
Qty: 30 EACH | Refills: 3 | Status: SHIPPED | OUTPATIENT
Start: 2022-08-18 | End: 2022-08-18 | Stop reason: SDUPTHER

## 2022-08-18 NOTE — PROGRESS NOTES
"Chief Complaint  Abdominal Pain and Back Pain (Just have cataract surgery)    Subjective        Glenis Bernardo presents to [facility]  History of Present Illness patient was seen for epigastric pain.  She has had epigastric pain over the past couple weeks.  Patient was tender in the epigastric area.  Patient was taking pantoprazole and was switched to omeprazole 40 mg daily.  Patient also has dysuria is having a UA C&S.  Results are pending at the time of dictation.  Patient has a vitamin D deficiency is supplementing with 5000 units vitamin D3 daily.  Vitamin D level is 44.8.  This was drawn 3 months ago and she is having levels drawn today.  Lipids being treated with diet exercise and cholestyramine.  Triglycerides 92, HDL 61, LDL 94.  Patient will continue present treatment.    Dictated utilizing Dragon dictation. If there are questions or for further clarification, please contact me.    Objective   Vital Signs:  Blood Pressure 122/80   Pulse 99   Temperature 97.3 °F (36.3 °C)   Height 157.5 cm (62\")   Weight 74.8 kg (165 lb)   Oxygen Saturation 97%   Body Mass Index 30.18 kg/m²   Estimated body mass index is 30.18 kg/m² as calculated from the following:    Height as of this encounter: 157.5 cm (62\").    Weight as of this encounter: 74.8 kg (165 lb).          Physical Exam  Vitals and nursing note reviewed.   Constitutional:       Appearance: Normal appearance. She is well-developed.   HENT:      Head: Normocephalic and atraumatic.      Nose: Nose normal.      Mouth/Throat:      Mouth: Mucous membranes are moist.      Pharynx: Oropharynx is clear.   Eyes:      Extraocular Movements: Extraocular movements intact.      Conjunctiva/sclera: Conjunctivae normal.      Pupils: Pupils are equal, round, and reactive to light.   Cardiovascular:      Rate and Rhythm: Normal rate and regular rhythm.      Heart sounds: Normal heart sounds. No murmur heard.    No friction rub. No gallop. "   Pulmonary:      Effort: Pulmonary effort is normal. No respiratory distress.      Breath sounds: Normal breath sounds. No stridor. No wheezing, rhonchi or rales.   Chest:      Chest wall: No tenderness.   Abdominal:      General: Bowel sounds are normal.      Palpations: Abdomen is soft.   Musculoskeletal:         General: Normal range of motion.      Cervical back: Normal range of motion and neck supple.   Skin:     General: Skin is warm and dry.   Neurological:      General: No focal deficit present.      Mental Status: She is alert and oriented to person, place, and time. Mental status is at baseline.   Psychiatric:         Mood and Affect: Mood normal.         Behavior: Behavior normal.         Thought Content: Thought content normal.         Judgment: Judgment normal.        Result Review :                Assessment and Plan  #1 switch pantoprazole to omeprazole 40 mg daily #2 labs 3 UA C&S #4 continue present diet and activity levels  Diagnoses and all orders for this visit:    1. Epigastric pain (Primary)  -     Amylase  -     Comprehensive Metabolic Panel; Future  -     CBC (No Diff); Future  -     Lipid Panel; Future  -     Vitamin D 25 Hydroxy; Future  -     Comprehensive Metabolic Panel  -     CBC (No Diff)  -     Lipid Panel  -     Vitamin D 25 Hydroxy    2. Vitamin D deficiency  -     Amylase  -     Comprehensive Metabolic Panel; Future  -     CBC (No Diff); Future  -     Lipid Panel; Future  -     Vitamin D 25 Hydroxy; Future  -     Comprehensive Metabolic Panel  -     CBC (No Diff)  -     Lipid Panel  -     Vitamin D 25 Hydroxy    3. Lipid screening  -     Amylase  -     Comprehensive Metabolic Panel; Future  -     CBC (No Diff); Future  -     Lipid Panel; Future  -     Vitamin D 25 Hydroxy; Future  -     Comprehensive Metabolic Panel  -     CBC (No Diff)  -     Lipid Panel  -     Vitamin D 25 Hydroxy    4. Pure hypercholesterolemia  -     Amylase  -     Comprehensive Metabolic Panel; Future  -      CBC (No Diff); Future  -     Lipid Panel; Future  -     Vitamin D 25 Hydroxy; Future  -     Comprehensive Metabolic Panel  -     CBC (No Diff)  -     Lipid Panel  -     Vitamin D 25 Hydroxy    5. Dysuria  -     Urinalysis With Microscopic - Urine, Clean Catch  -     Urine Culture - Urine, Urine, Clean Catch    Other orders  -     omeprazole (priLOSEC) 40 MG capsule; Take 1 capsule by mouth Daily.  Dispense: 90 capsule; Refill: 1  -     acyclovir (ZOVIRAX) 5 % cream; Apply 1 application topically to the appropriate area as directed Every 3 (Three) Hours.  Dispense: 5 g; Refill: 3  -     Discontinue: Hyoscyamine Sulfate SL (Levsin/SL) 0.125 MG sublingual tablet; Place 0.125 mg under the tongue 4 (Four) Times a Day As Needed (cramps).  Dispense: 30 each; Refill: 3  -     Hyoscyamine Sulfate SL (Levsin/SL) 0.125 MG sublingual tablet; Place 0.125 mg under the tongue 4 (Four) Times a Day As Needed (cramps).  Dispense: 30 each; Refill: 3             Follow Up   Return in about 4 months (around 12/18/2022), or if symptoms worsen or fail to improve, for Recheck.  Patient was given instructions and counseling regarding her condition or for health maintenance advice. Please see specific information pulled into the AVS if appropriate.

## 2022-08-19 LAB — BACTERIA SPEC AEROBE CULT: NO GROWTH

## 2022-08-30 ENCOUNTER — OFFICE VISIT (OUTPATIENT)
Dept: FAMILY MEDICINE CLINIC | Facility: CLINIC | Age: 62
End: 2022-08-30

## 2022-08-30 VITALS
WEIGHT: 162 LBS | BODY MASS INDEX: 29.81 KG/M2 | HEIGHT: 62 IN | OXYGEN SATURATION: 94 % | SYSTOLIC BLOOD PRESSURE: 118 MMHG | DIASTOLIC BLOOD PRESSURE: 70 MMHG | HEART RATE: 90 BPM | RESPIRATION RATE: 18 BRPM | TEMPERATURE: 97.5 F

## 2022-08-30 DIAGNOSIS — B37.81 THRUSH OF MOUTH AND ESOPHAGUS: ICD-10-CM

## 2022-08-30 DIAGNOSIS — M72.2 PLANTAR FASCIITIS: Primary | ICD-10-CM

## 2022-08-30 DIAGNOSIS — B37.0 THRUSH OF MOUTH AND ESOPHAGUS: ICD-10-CM

## 2022-08-30 DIAGNOSIS — K76.0 FATTY LIVER: ICD-10-CM

## 2022-08-30 DIAGNOSIS — R19.7 DIARRHEA, UNSPECIFIED TYPE: ICD-10-CM

## 2022-08-30 DIAGNOSIS — R74.8 ELEVATED LIVER ENZYMES: ICD-10-CM

## 2022-08-30 LAB — KOH PREP NAIL: NORMAL

## 2022-08-30 PROCEDURE — 99214 OFFICE O/P EST MOD 30 MIN: CPT | Performed by: INTERNAL MEDICINE

## 2022-08-30 PROCEDURE — 87220 TISSUE EXAM FOR FUNGI: CPT | Performed by: INTERNAL MEDICINE

## 2022-08-30 RX ORDER — PANTOPRAZOLE SODIUM 40 MG/1
40 TABLET, DELAYED RELEASE ORAL DAILY
Qty: 90 TABLET | Refills: 1 | Status: SHIPPED | OUTPATIENT
Start: 2022-08-30 | End: 2022-12-09 | Stop reason: SDUPTHER

## 2022-08-30 RX ORDER — PROPYLENE GLYCOL 0.06 MG/ML
SOLUTION/ DROPS OPHTHALMIC
COMMUNITY

## 2022-08-30 NOTE — PROGRESS NOTES
"Chief Complaint  Abdominal Pain (Started 2 new meds follow up Levsin and omeprazole not much change with meds)    Subjective        Glenis Bernardo presents to [facility]  History of Present Illness patient was seen for Planter fasciitis.  Patient has severe foot and heel pain.  Patient has been using shoe insert, iced water bottles, stretching exercises without relief.  Patient wants to try sketchers before seeing a podiatrist.  Patient not been able to walk more than half a block without sitting down.  Patient diarrhea is being managed by GI.  Patient has post cholecystectomy diarrhea.  Patient is being managed by cholestyramine and Imodium.  Patient does have a fatty liver and is taking Vit E 200 units daily.  Patient is given a low triglyceride diet and asked to exercise as well as possible.  Patient did have a whitish exudate is having KOH.  KOH of the tongue is pending at time of dictation and if positive will get Diflucan 100 mg for 2 weeks and then recheck RAMAN.    Dictated utilizing Dragon dictation. If there are questions or for further clarification, please contact me.    Objective   Vital Signs:  Blood Pressure 118/70 (BP Location: Left arm, Patient Position: Sitting)   Pulse 90   Temperature 97.5 °F (36.4 °C) (Infrared)   Respiration 18   Height 157.5 cm (62\")   Weight 73.5 kg (162 lb)   Oxygen Saturation 94%   Body Mass Index 29.63 kg/m²   Estimated body mass index is 29.63 kg/m² as calculated from the following:    Height as of this encounter: 157.5 cm (62\").    Weight as of this encounter: 73.5 kg (162 lb).          Physical Exam  Vitals and nursing note reviewed.   Constitutional:       Appearance: Normal appearance. She is well-developed.   HENT:      Head: Normocephalic and atraumatic.      Nose: Nose normal.      Mouth/Throat:      Mouth: Mucous membranes are moist.      Pharynx: Oropharyngeal exudate present.   Eyes:      Extraocular Movements: Extraocular " movements intact.      Conjunctiva/sclera: Conjunctivae normal.      Pupils: Pupils are equal, round, and reactive to light.   Cardiovascular:      Rate and Rhythm: Normal rate and regular rhythm.      Heart sounds: Normal heart sounds. No murmur heard.    No friction rub. No gallop.   Pulmonary:      Effort: Pulmonary effort is normal. No respiratory distress.      Breath sounds: Normal breath sounds. No stridor. No wheezing, rhonchi or rales.   Chest:      Chest wall: No tenderness.   Abdominal:      General: Bowel sounds are normal.      Palpations: Abdomen is soft.   Musculoskeletal:         General: Swelling and tenderness present. Normal range of motion.      Cervical back: Normal range of motion and neck supple.   Skin:     General: Skin is warm and dry.   Neurological:      General: No focal deficit present.      Mental Status: She is alert and oriented to person, place, and time. Mental status is at baseline.   Psychiatric:         Mood and Affect: Mood normal.         Behavior: Behavior normal.         Thought Content: Thought content normal.         Judgment: Judgment normal.        Result Review :                Assessment and Plan  #1 recommend podiatrist and better foot support #2 low triglyceride diet and exercise #3 KOH of the tongue, if positive Diflucan 100 mg daily x2 weeks and then KOH again  Diagnoses and all orders for this visit:    1. Plantar fasciitis (Primary)    2. Thrush of mouth and esophagus (HCC)  -     KOH Prep - Swab, Tongue    3. Fatty liver    4. Elevated liver enzymes    5. Diarrhea, unspecified type    Other orders  -     pantoprazole (Protonix) 40 MG EC tablet; Take 1 tablet by mouth Daily.  Dispense: 90 tablet; Refill: 1             Follow Up   Return in about 4 weeks (around 9/27/2022), or if symptoms worsen or fail to improve, for Recheck.  Patient was given instructions and counseling regarding her condition or for health maintenance advice. Please see specific information  pulled into the AVS if appropriate.

## 2022-08-31 ENCOUNTER — TELEPHONE (OUTPATIENT)
Dept: FAMILY MEDICINE CLINIC | Facility: CLINIC | Age: 62
End: 2022-08-31

## 2022-08-31 RX ORDER — VALACYCLOVIR HYDROCHLORIDE 1 G/1
1000 TABLET, FILM COATED ORAL 3 TIMES DAILY
Qty: 21 TABLET | Refills: 3 | Status: SHIPPED | OUTPATIENT
Start: 2022-08-31 | End: 2022-12-09 | Stop reason: SDUPTHER

## 2022-08-31 NOTE — TELEPHONE ENCOUNTER
Caller: Darius Bernardo    Relationship to patient: Emergency Contact    Best call back number: [phone]    Patient is needing: PATIENT WOULD LIKE THE PILLS FOR THE FEVER BLISTERS

## 2022-09-07 ENCOUNTER — HOSPITAL ENCOUNTER (OUTPATIENT)
Dept: CT IMAGING | Facility: HOSPITAL | Age: 62
Discharge: HOME | End: 2022-09-07
Admitting: INTERNAL MEDICINE

## 2022-09-07 DIAGNOSIS — R31.9 HEMATURIA, UNSPECIFIED TYPE: ICD-10-CM

## 2022-09-07 PROCEDURE — 74176 CT ABD & PELVIS W/O CONTRAST: CPT

## 2022-09-08 ENCOUNTER — TELEPHONE (OUTPATIENT)
Dept: FAMILY MEDICINE CLINIC | Facility: CLINIC | Age: 62
End: 2022-09-08

## 2022-09-08 PROBLEM — K57.90 DIVERTICULOSIS: Status: ACTIVE | Noted: 2022-09-08

## 2022-09-08 RX ORDER — PRAVASTATIN SODIUM 40 MG
TABLET ORAL
Qty: 30 TABLET | Refills: 3 | Status: SHIPPED | OUTPATIENT
Start: 2022-09-08 | End: 2022-12-09 | Stop reason: SDUPTHER

## 2022-09-08 NOTE — TELEPHONE ENCOUNTER
PATIENTS  KUSHAL(AVELINA) IS REQUESTING A CALL BACK FROM DR. ESPOSITO OR HIS NURSE TO DISCUSS THE RESULTS OF THE CT SCAN OF PATIENT ABDOMEN THAT WAS COMPLETED YESTERDAY.. HE SEES THEM IN THE MYCHART BUT DOESN'T QUITE KNOW HOW TO INTERPRET THEM. PLEASE CALL ASAP.     > [phone]

## 2022-09-09 DIAGNOSIS — K65.1 MESENTERIC ABSCESS: Primary | ICD-10-CM

## 2022-09-20 ENCOUNTER — OFFICE VISIT (OUTPATIENT)
Dept: SURGERY | Facility: CLINIC | Age: 62
End: 2022-09-20

## 2022-09-20 VITALS — BODY MASS INDEX: 30.66 KG/M2 | WEIGHT: 166.6 LBS | HEIGHT: 62 IN

## 2022-09-20 DIAGNOSIS — K63.89 EPIPLOIC APPENDAGITIS: Primary | ICD-10-CM

## 2022-09-20 PROCEDURE — 99214 OFFICE O/P EST MOD 30 MIN: CPT | Performed by: SURGERY

## 2022-09-20 NOTE — PROGRESS NOTES
Subjective   Glenis Bernardo is a 62 y.o. female who returns to the office from Skyler Velasquez MD for abdominal pain.    History of Present Illness     The patient developed left lower quadrant abdominal pain and some hematuria that was concerning for a kidney stone.  She was evaluated with a CT scan of the abdomen and pelvis that showed inflammatory changes adjacent to the sigmoid colon and the fatty tissue consistent with epiploic appendagitis.  She states the pain was intense for about 3 days and started to improve.  She did not have any associated nausea or vomiting.  Her appetite has remained good and she is having bowel function.    She had a colonoscopy last year that was normal except for diverticulosis.  The recommendation was for repeat colonoscopy in 10 years.    Review of Systems   Constitutional: Negative for fatigue and fever.   Respiratory: Negative for chest tightness and shortness of breath.    Cardiovascular: Negative for chest pain and palpitations.   Gastrointestinal: Positive for abdominal pain. Negative for blood in stool, constipation, diarrhea, nausea and vomiting.     Past Medical History:   Diagnosis Date   • Anxiety    • Calculus of gallbladder with chronic cholecystitis without obstruction    • Choroid melanoma of left eye (HCC)    • Diverticulosis    • Fatty liver    • Gallstones    • H/O bladder infections    • History of colon polyps    • History of radiation therapy    • Hyperlipidemia    • LUQ pain      Past Surgical History:   Procedure Laterality Date   • BREAST BIOPSY Left    • CHOLECYSTECTOMY WITH INTRAOPERATIVE CHOLANGIOGRAM N/A 12/29/2021    Procedure: CHOLECYSTECTOMY LAPAROSCOPIC INTRAOPERATIVE CHOLANGIOGRAM;  Surgeon: Tenzin Rivera Jr., MD;  Location: [facility];  Service: General;  Laterality: N/A;   • COLONOSCOPY N/A 2/12/2018    Procedure: COLONOSCOPY TO CECUM WITH POLYPECTOMY HOT SNARE, COLD BIOPSY, COLD SNARE;  Surgeon: Juan Espinosa MD;  Location: [facility]  ENDOSCOPY;  Service:    • COLONOSCOPY W/ POLYPECTOMY N/A 2021    Procedure: COLONOSCOPY into cecum;  Surgeon: Juan Espinosa MD;  Location:  [facility];  Service: Gastroenterology;  Laterality: N/A;  diverticulosis   • ENDOSCOPY N/A 2021    Procedure: ESOPHAGOGASTRODUODENOSCOPY WITH BIOPSY;  Surgeon: Juan Espinosa MD;  Location:  [facility];  Service: Gastroenterology;  Laterality: N/A;  schatzskis ring, hiatal hernia   • EYE SURGERY       Family History   Problem Relation Age of Onset   • Heart failure Mother    • Diabetes Mother    • Heart failure Father    • Diabetes Sister    • Skin cancer Sister    • Diabetes Brother    • Diabetes Sister    • Breast cancer Sister    • Prostate cancer Brother    • Cirrhosis Sister    • Parkinsonism Brother    • Malig Hyperthermia Neg Hx      Social History     Socioeconomic History   • Marital status:    • Number of children: 1   Tobacco Use   • Smoking status: Former Smoker     Packs/day: 1.00     Years: 35.00     Pack years: 35.00     Types: Electronic Cigarette, Cigarettes     Quit date:      Years since quittin.7   • Smokeless tobacco: Never Used   • Tobacco comment: cig use for 35 years, then e-cig use for 4 years    Vaping Use   • Vaping Use: Former   • Substances: Nicotine, Flavoring   • Devices: Refillable tank   Substance and Sexual Activity   • Alcohol use: Yes     Alcohol/week: 2.0 - 3.0 standard drinks     Types: 2 - 3 Cans of beer per week     Comment: Pt reports drinking beer every night.    • Drug use: No   • Sexual activity: Defer       Objective   Physical Exam  Constitutional:       Appearance: Normal appearance. She is well-developed. She is not toxic-appearing.   Eyes:      General: No scleral icterus.  Pulmonary:      Effort: Pulmonary effort is normal. No respiratory distress.   Abdominal:      Palpations: Abdomen is soft.      Tenderness: There is no abdominal tenderness.   Skin:     General: Skin is warm  and dry.   Neurological:      Mental Status: She is alert and oriented to person, place, and time.   Psychiatric:         Behavior: Behavior normal.         Thought Content: Thought content normal.         Judgment: Judgment normal.         Assessment & Plan     1.  Epiploic appendagitis: The patient had acute abdominal pain and a CT scan of the abdomen and pelvis that shows inflammatory changes adjacent to the sigmoid colon consistent with epiploic appendagitis.  Her symptoms have improved and there is no need for surgical management.  This was discussed with the patient and her .  She will follow-up on an as-needed basis.

## 2022-09-26 RX ORDER — LANOLIN ALCOHOL/MO/W.PET/CERES
CREAM (GRAM) TOPICAL
Qty: 30 CAPSULE | Refills: 0 | Status: SHIPPED | OUTPATIENT
Start: 2022-09-26 | End: 2022-12-09 | Stop reason: SDUPTHER

## 2022-11-08 DIAGNOSIS — F41.9 ANXIETY: ICD-10-CM

## 2022-11-08 RX ORDER — LORAZEPAM 1 MG/1
TABLET ORAL
Qty: 60 TABLET | Refills: 3 | Status: SHIPPED | OUTPATIENT
Start: 2022-11-08 | End: 2022-12-09 | Stop reason: SDUPTHER

## 2022-12-09 ENCOUNTER — OFFICE VISIT (OUTPATIENT)
Dept: FAMILY MEDICINE CLINIC | Facility: CLINIC | Age: 62
End: 2022-12-09

## 2022-12-09 VITALS
HEART RATE: 87 BPM | WEIGHT: 166 LBS | SYSTOLIC BLOOD PRESSURE: 122 MMHG | BODY MASS INDEX: 30.55 KG/M2 | OXYGEN SATURATION: 95 % | DIASTOLIC BLOOD PRESSURE: 70 MMHG | HEIGHT: 62 IN | TEMPERATURE: 96.9 F

## 2022-12-09 DIAGNOSIS — K57.90 DIVERTICULOSIS: ICD-10-CM

## 2022-12-09 DIAGNOSIS — R74.8 ELEVATED LIVER ENZYMES: ICD-10-CM

## 2022-12-09 DIAGNOSIS — E55.9 VITAMIN D DEFICIENCY: ICD-10-CM

## 2022-12-09 DIAGNOSIS — F41.9 ANXIETY: Primary | ICD-10-CM

## 2022-12-09 PROBLEM — K80.20 GALLSTONES: Status: RESOLVED | Noted: 2021-05-11 | Resolved: 2022-12-09

## 2022-12-09 PROBLEM — K44.9 PARAESOPHAGEAL HIATAL HERNIA: Status: ACTIVE | Noted: 2022-12-09

## 2022-12-09 PROBLEM — K80.10 CALCULUS OF GALLBLADDER WITH CHRONIC CHOLECYSTITIS WITHOUT OBSTRUCTION: Status: RESOLVED | Noted: 2021-12-23 | Resolved: 2022-12-09

## 2022-12-09 PROBLEM — Z90.49 S/P CHOLECYSTECTOMY: Status: ACTIVE | Noted: 2022-12-09

## 2022-12-09 PROCEDURE — 90686 IIV4 VACC NO PRSV 0.5 ML IM: CPT | Performed by: INTERNAL MEDICINE

## 2022-12-09 PROCEDURE — 99214 OFFICE O/P EST MOD 30 MIN: CPT | Performed by: INTERNAL MEDICINE

## 2022-12-09 PROCEDURE — 90471 IMMUNIZATION ADMIN: CPT | Performed by: INTERNAL MEDICINE

## 2022-12-09 RX ORDER — PRAVASTATIN SODIUM 40 MG
40 TABLET ORAL DAILY
Qty: 90 TABLET | Refills: 1 | Status: SHIPPED | OUTPATIENT
Start: 2022-12-09

## 2022-12-09 RX ORDER — LORAZEPAM 1 MG/1
1 TABLET ORAL 2 TIMES DAILY PRN
Qty: 60 TABLET | Refills: 3 | Status: SHIPPED | OUTPATIENT
Start: 2022-12-09

## 2022-12-09 RX ORDER — VALACYCLOVIR HYDROCHLORIDE 1 G/1
1000 TABLET, FILM COATED ORAL 3 TIMES DAILY
Qty: 21 TABLET | Refills: 3 | Status: SHIPPED | OUTPATIENT
Start: 2022-12-09

## 2022-12-09 RX ORDER — PANTOPRAZOLE SODIUM 40 MG/1
40 TABLET, DELAYED RELEASE ORAL DAILY
Qty: 90 TABLET | Refills: 1 | Status: SHIPPED | OUTPATIENT
Start: 2022-12-09 | End: 2023-03-02

## 2022-12-09 NOTE — PROGRESS NOTES
"Chief Complaint  med refill/3 month check up had ct    Subjective        Glenis Bernardo presents to [facility]  History of Present Illness patient was seen for anxiety.  Patient uses Ativan 1 mg twice daily.  Patient's anxiety is controlled with this medication.  Patient also has diverticulosis.  Patient has not had abdominal pain GI bleeding or cramping over the past several months.  Patient will continue present treatment.  Patient does have a history of elevated LFTs.  Patient does use vitamin D 200 units daily.  Patient is getting labs drawn today.  Patient does have a vitamin D3 deficiency and is supplementing with over-the-counter D3.  Patient is using 5000 units daily.  Previous vitamin D level was 48.43 months ago.  Patient is having it rechecked today.    Dictated utilizing Dragon dictation. If there are questions or for further clarification, please contact me.    Objective   Vital Signs:  Blood Pressure 122/70   Pulse 87   Temperature 96.9 °F (36.1 °C)   Height 157.5 cm (62\")   Weight 75.3 kg (166 lb)   Oxygen Saturation 95%   Body Mass Index 30.36 kg/m²   Estimated body mass index is 30.36 kg/m² as calculated from the following:    Height as of this encounter: 157.5 cm (62\").    Weight as of this encounter: 75.3 kg (166 lb).          Physical Exam  Vitals and nursing note reviewed.   Constitutional:       Appearance: Normal appearance. She is well-developed.   HENT:      Head: Normocephalic and atraumatic.      Nose: Nose normal.      Mouth/Throat:      Mouth: Mucous membranes are moist.      Pharynx: Oropharynx is clear.   Eyes:      Extraocular Movements: Extraocular movements intact.      Conjunctiva/sclera: Conjunctivae normal.      Pupils: Pupils are equal, round, and reactive to light.   Cardiovascular:      Rate and Rhythm: Normal rate and regular rhythm.      Heart sounds: Normal heart sounds. No murmur heard.    No friction rub. No gallop.   Pulmonary:      " Effort: Pulmonary effort is normal. No respiratory distress.      Breath sounds: Normal breath sounds. No stridor. No wheezing, rhonchi or rales.   Chest:      Chest wall: No tenderness.   Abdominal:      General: Bowel sounds are normal.      Palpations: Abdomen is soft.   Musculoskeletal:         General: Normal range of motion.      Cervical back: Normal range of motion and neck supple.   Skin:     General: Skin is warm and dry.   Neurological:      General: No focal deficit present.      Mental Status: She is alert and oriented to person, place, and time. Mental status is at baseline.   Psychiatric:         Behavior: Behavior normal.         Thought Content: Thought content normal.         Judgment: Judgment normal.      Comments: Patient with anxiety controlled with lorazepam        Result Review :                Assessment and Plan  #1 labs #2 follow-up in 4 months  Diagnoses and all orders for this visit:    1. Anxiety (Primary)  -     LORazepam (ATIVAN) 1 MG tablet; Take 1 tablet by mouth 2 (Two) Times a Day As Needed for Anxiety.  Dispense: 60 tablet; Refill: 3  -     CBC (No Diff)  -     Comprehensive Metabolic Panel  -     Lipid Panel  -     Vitamin D,25-Hydroxy    2. Diverticulosis  -     CBC (No Diff)  -     Comprehensive Metabolic Panel  -     Lipid Panel  -     Vitamin D,25-Hydroxy    3. Elevated liver enzymes  -     CBC (No Diff)  -     Comprehensive Metabolic Panel  -     Lipid Panel  -     Vitamin D,25-Hydroxy    4. Vitamin D deficiency  -     CBC (No Diff)  -     Comprehensive Metabolic Panel  -     Lipid Panel  -     Vitamin D,25-Hydroxy    Other orders  -     pantoprazole (Protonix) 40 MG EC tablet; Take 1 tablet by mouth Daily.  Dispense: 90 tablet; Refill: 1  -     pravastatin (PRAVACHOL) 40 MG tablet; Take 1 tablet by mouth Daily.  Dispense: 90 tablet; Refill: 1  -     valACYclovir (Valtrex) 1000 MG tablet; Take 1 tablet by mouth 3 (Three) Times a Day.  Dispense: 21 tablet; Refill: 3  -      vitamin E 200 UNIT capsule; Take 1 capsule by mouth Daily.  Dispense: 90 capsule; Refill: 1  -     FluLaval/Fluarix/Fluzone >6 Months             Follow Up   Return in about 4 months (around 4/9/2023), or if symptoms worsen or fail to improve, for Recheck.  Patient was given instructions and counseling regarding her condition or for health maintenance advice. Please see specific information pulled into the AVS if appropriate.

## 2022-12-10 LAB
25(OH)D3+25(OH)D2 SERPL-MCNC: 29.9 NG/ML (ref 30–100)
ALBUMIN SERPL-MCNC: 4.5 G/DL (ref 3.8–4.9)
ALBUMIN/GLOB SERPL: 1.9 {RATIO} (ref 1.2–2.2)
ALP SERPL-CCNC: 109 IU/L (ref 44–121)
ALT SERPL-CCNC: 28 IU/L (ref 0–32)
AST SERPL-CCNC: 30 IU/L (ref 0–40)
BILIRUB SERPL-MCNC: 0.4 MG/DL (ref 0–1.2)
BUN SERPL-MCNC: 10 MG/DL (ref 8–27)
BUN/CREAT SERPL: 14 (ref 12–28)
CALCIUM SERPL-MCNC: 9.2 MG/DL (ref 8.7–10.3)
CHLORIDE SERPL-SCNC: 100 MMOL/L (ref 96–106)
CHOLEST SERPL-MCNC: 203 MG/DL (ref 100–199)
CO2 SERPL-SCNC: 23 MMOL/L (ref 20–29)
CREAT SERPL-MCNC: 0.73 MG/DL (ref 0.57–1)
EGFRCR SERPLBLD CKD-EPI 2021: 94 ML/MIN/1.73
ERYTHROCYTE [DISTWIDTH] IN BLOOD BY AUTOMATED COUNT: 12 % (ref 11.7–15.4)
GLOBULIN SER CALC-MCNC: 2.4 G/DL (ref 1.5–4.5)
GLUCOSE SERPL-MCNC: 105 MG/DL (ref 70–99)
HCT VFR BLD AUTO: 42.3 % (ref 34–46.6)
HDLC SERPL-MCNC: 56 MG/DL
HGB BLD-MCNC: 14.1 G/DL (ref 11.1–15.9)
LDLC SERPL CALC-MCNC: 120 MG/DL (ref 0–99)
MCH RBC QN AUTO: 32.3 PG (ref 26.6–33)
MCHC RBC AUTO-ENTMCNC: 33.3 G/DL (ref 31.5–35.7)
MCV RBC AUTO: 97 FL (ref 79–97)
PLATELET # BLD AUTO: 279 X10E3/UL (ref 150–450)
POTASSIUM SERPL-SCNC: 4.9 MMOL/L (ref 3.5–5.2)
PROT SERPL-MCNC: 6.9 G/DL (ref 6–8.5)
RBC # BLD AUTO: 4.37 X10E6/UL (ref 3.77–5.28)
SODIUM SERPL-SCNC: 140 MMOL/L (ref 134–144)
TRIGL SERPL-MCNC: 156 MG/DL (ref 0–149)
VLDLC SERPL CALC-MCNC: 27 MG/DL (ref 5–40)
WBC # BLD AUTO: 9.5 X10E3/UL (ref 3.4–10.8)

## 2023-02-02 RX ORDER — CHOLESTYRAMINE 4 G/9G
POWDER, FOR SUSPENSION ORAL
Qty: 378 G | Refills: 3 | Status: SHIPPED | OUTPATIENT
Start: 2023-02-02

## 2023-03-02 RX ORDER — PANTOPRAZOLE SODIUM 40 MG/1
40 TABLET, DELAYED RELEASE ORAL DAILY
Qty: 90 TABLET | Refills: 1 | Status: SHIPPED | OUTPATIENT
Start: 2023-03-02 | End: 2023-04-07 | Stop reason: SDUPTHER

## 2023-03-02 RX ORDER — PANTOPRAZOLE SODIUM 40 MG/1
40 TABLET, DELAYED RELEASE ORAL DAILY
Qty: 90 TABLET | Refills: 1 | Status: SHIPPED | OUTPATIENT
Start: 2023-03-02

## 2023-04-05 ENCOUNTER — TELEPHONE (OUTPATIENT)
Dept: FAMILY MEDICINE CLINIC | Facility: CLINIC | Age: 63
End: 2023-04-05
Payer: COMMERCIAL

## 2023-04-05 NOTE — TELEPHONE ENCOUNTER
Hub staff attempted to follow warm transfer process and was unsuccessful     Caller: Darius Bernardo    Relationship to patient: Emergency Contact    Best call back number: [phone]    Patient is needing: PATIENTS  STATED PATIENT WOULD LIKE TO BE SEEN TODAY FOR ABDOMINAL PAIN.    PLEASE CALL.

## 2023-04-07 ENCOUNTER — OFFICE VISIT (OUTPATIENT)
Dept: FAMILY MEDICINE CLINIC | Facility: CLINIC | Age: 63
End: 2023-04-07
Payer: COMMERCIAL

## 2023-04-07 VITALS
OXYGEN SATURATION: 97 % | BODY MASS INDEX: 29.92 KG/M2 | DIASTOLIC BLOOD PRESSURE: 90 MMHG | WEIGHT: 162.6 LBS | TEMPERATURE: 98.2 F | HEART RATE: 127 BPM | HEIGHT: 62 IN | SYSTOLIC BLOOD PRESSURE: 126 MMHG

## 2023-04-07 DIAGNOSIS — R10.9 ABDOMINAL PAIN, UNSPECIFIED ABDOMINAL LOCATION: Primary | ICD-10-CM

## 2023-04-07 DIAGNOSIS — K76.0 FATTY LIVER: ICD-10-CM

## 2023-04-07 DIAGNOSIS — R31.9 HEMATURIA, UNSPECIFIED TYPE: ICD-10-CM

## 2023-04-07 DIAGNOSIS — R79.89 ELEVATED LIVER FUNCTION TESTS: ICD-10-CM

## 2023-04-07 DIAGNOSIS — R82.2 BILIRUBINURIA: ICD-10-CM

## 2023-04-07 DIAGNOSIS — R73.9 HYPERGLYCEMIA: ICD-10-CM

## 2023-04-07 DIAGNOSIS — R80.9 PROTEINURIA, UNSPECIFIED TYPE: ICD-10-CM

## 2023-04-07 LAB
BILIRUB BLD-MCNC: ABNORMAL MG/DL
CLARITY, POC: CLEAR
COLOR UR: YELLOW
EXPIRATION DATE: ABNORMAL
GLUCOSE UR STRIP-MCNC: NEGATIVE MG/DL
KETONES UR QL: ABNORMAL
LEUKOCYTE EST, POC: NEGATIVE
Lab: ABNORMAL
NITRITE UR-MCNC: NEGATIVE MG/ML
PH UR: 5.5 [PH] (ref 5–8)
PROT UR STRIP-MCNC: ABNORMAL MG/DL
RBC # UR STRIP: ABNORMAL /UL
SP GR UR: 1.03 (ref 1–1.03)
UROBILINOGEN UR QL: ABNORMAL

## 2023-04-07 RX ORDER — NITROFURANTOIN 25; 75 MG/1; MG/1
100 CAPSULE ORAL 2 TIMES DAILY
Qty: 10 CAPSULE | Refills: 0 | Status: SHIPPED | OUTPATIENT
Start: 2023-04-07 | End: 2023-04-12

## 2023-04-07 NOTE — PROGRESS NOTES
"Chief Complaint  Abdominal Pain (Three days.) and Back Pain    Subjective        Glenis Bernardo presents to [facility]  History of Present Illness  Patient is a 62-year-old female, established patient of Dr. Velasquez, and new to me.  Patient here today with right lower abdominal pain that started 3 days ago. Patient reports pain radiates up to RUQ and to bilateral upper back.  Patient denies dysuria or vomiting.  Patient reports she has constant diarrhea due to history of cholecystectomy.   Last labs collected 12/9/2022 reveal elevated glucose.  Patient due for repeat labs and is not fasting today. [facility], in past for hematuria.  Patient had cystoscopy but states she never followed up.  Patient denies having history of kidney stones.    Objective   Vital Signs:  /90 (BP Location: Left arm, Patient Position: Sitting, Cuff Size: Adult)   Pulse (!) 127   Temp 98.2 °F (36.8 °C)   Ht 157.5 cm (62.01\")   Wt 73.8 kg (162 lb 9.6 oz)   SpO2 97%   BMI 29.73 kg/m²   Estimated body mass index is 29.73 kg/m² as calculated from the following:    Height as of this encounter: 157.5 cm (62.01\").    Weight as of this encounter: 73.8 kg (162 lb 9.6 oz).             Physical Exam  Constitutional:       General: She is awake.      Appearance: Normal appearance.   HENT:      Head: Normocephalic and atraumatic.      Nose: Nose normal.   Eyes:      Extraocular Movements: Extraocular movements intact.      Conjunctiva/sclera: Conjunctivae normal.      Pupils: Pupils are equal, round, and reactive to light.   Cardiovascular:      Rate and Rhythm: Regular rhythm. Tachycardia present.      Pulses: Normal pulses.      Heart sounds: Normal heart sounds.   Pulmonary:      Effort: Pulmonary effort is normal.      Breath sounds: Normal breath sounds and air entry.   Skin:     General: Skin is warm and dry.   Neurological:      General: No focal deficit present.      Mental " Status: She is alert and oriented to person, place, and time. Mental status is at baseline.   Psychiatric:         Attention and Perception: Attention normal.         Behavior: Behavior normal. Behavior is cooperative.        Result Review :                   Assessment and Plan   Diagnoses and all orders for this visit:    1. Abdominal pain, unspecified abdominal location (Primary)  -     Comprehensive Metabolic Panel  -     CBC & Differential  -     POCT urinalysis dipstick, automated    2. Hematuria, unspecified type  -     POCT urinalysis dipstick, automated  -     Urine Culture - Urine, Urine, Clean Catch  -     nitrofurantoin, macrocrystal-monohydrate, (Macrobid) 100 MG capsule; Take 1 capsule by mouth 2 (Two) Times a Day for 5 days.  Dispense: 10 capsule; Refill: 0    3. Hyperglycemia  -     Comprehensive Metabolic Panel  -     CBC & Differential  -     Hemoglobin A1c    4. Proteinuria, unspecified type  -     Hemoglobin A1c  -     POCT urinalysis dipstick, automated  -     Urine Culture - Urine, Urine, Clean Catch    Please call [facility] and follow-up with urologist about blood in your urine.    I am drawing labs today and screening you for diabetes.    Start taking Macrobid twice a day for 5 days.  I will send your urine off for culture.    Patient agrees with plan of care and understands instructions. Call if worsening symptoms or any problems or concerns.          Follow Up   Return if symptoms worsen or fail to improve.  Patient was given instructions and counseling regarding her condition or for health maintenance advice. Please see specific information pulled into the AVS if appropriate.

## 2023-04-07 NOTE — PATIENT INSTRUCTIONS
Please call [facility] and follow-up with urologist about blood in your urine.    I am drawing labs today and screening you for diabetes.    Start taking Macrobid twice a day for 5 days.  I will send your urine off for culture.    Patient agrees with plan of care and understands instructions. Call if worsening symptoms or any problems or concerns.

## 2023-04-08 LAB
ALBUMIN SERPL-MCNC: 4.1 G/DL (ref 3.8–4.9)
ALBUMIN/GLOB SERPL: 1.5 {RATIO} (ref 1.2–2.2)
ALP SERPL-CCNC: 131 IU/L (ref 44–121)
ALT SERPL-CCNC: 59 IU/L (ref 0–32)
AST SERPL-CCNC: 69 IU/L (ref 0–40)
BASOPHILS # BLD AUTO: 0 X10E3/UL (ref 0–0.2)
BASOPHILS NFR BLD AUTO: 0 %
BILIRUB SERPL-MCNC: 0.7 MG/DL (ref 0–1.2)
BUN SERPL-MCNC: 8 MG/DL (ref 8–27)
BUN/CREAT SERPL: 12 (ref 12–28)
CALCIUM SERPL-MCNC: 9.2 MG/DL (ref 8.7–10.3)
CHLORIDE SERPL-SCNC: 99 MMOL/L (ref 96–106)
CO2 SERPL-SCNC: 22 MMOL/L (ref 20–29)
CREAT SERPL-MCNC: 0.69 MG/DL (ref 0.57–1)
EGFRCR SERPLBLD CKD-EPI 2021: 99 ML/MIN/1.73
EOSINOPHIL # BLD AUTO: 0.2 X10E3/UL (ref 0–0.4)
EOSINOPHIL NFR BLD AUTO: 2 %
ERYTHROCYTE [DISTWIDTH] IN BLOOD BY AUTOMATED COUNT: 11.9 % (ref 11.7–15.4)
GLOBULIN SER CALC-MCNC: 2.7 G/DL (ref 1.5–4.5)
GLUCOSE SERPL-MCNC: 125 MG/DL (ref 70–99)
HBA1C MFR BLD: 5.8 % (ref 4.8–5.6)
HCT VFR BLD AUTO: 42.5 % (ref 34–46.6)
HGB BLD-MCNC: 14.4 G/DL (ref 11.1–15.9)
IMM GRANULOCYTES # BLD AUTO: 0.1 X10E3/UL (ref 0–0.1)
IMM GRANULOCYTES NFR BLD AUTO: 1 %
LYMPHOCYTES # BLD AUTO: 1.8 X10E3/UL (ref 0.7–3.1)
LYMPHOCYTES NFR BLD AUTO: 16 %
MCH RBC QN AUTO: 33.1 PG (ref 26.6–33)
MCHC RBC AUTO-ENTMCNC: 33.9 G/DL (ref 31.5–35.7)
MCV RBC AUTO: 98 FL (ref 79–97)
MONOCYTES # BLD AUTO: 0.6 X10E3/UL (ref 0.1–0.9)
MONOCYTES NFR BLD AUTO: 5 %
NEUTROPHILS # BLD AUTO: 8.5 X10E3/UL (ref 1.4–7)
NEUTROPHILS NFR BLD AUTO: 76 %
PLATELET # BLD AUTO: 230 X10E3/UL (ref 150–450)
POTASSIUM SERPL-SCNC: 4.3 MMOL/L (ref 3.5–5.2)
PROT SERPL-MCNC: 6.8 G/DL (ref 6–8.5)
RBC # BLD AUTO: 4.35 X10E6/UL (ref 3.77–5.28)
SODIUM SERPL-SCNC: 137 MMOL/L (ref 134–144)
WBC # BLD AUTO: 11.1 X10E3/UL (ref 3.4–10.8)

## 2023-04-09 LAB
BACTERIA UR CULT: NORMAL
BACTERIA UR CULT: NORMAL

## 2023-04-11 ENCOUNTER — TELEPHONE (OUTPATIENT)
Dept: FAMILY MEDICINE CLINIC | Facility: CLINIC | Age: 63
End: 2023-04-11
Payer: COMMERCIAL

## 2023-04-11 RX ORDER — PHENAZOPYRIDINE HYDROCHLORIDE 100 MG/1
100 TABLET, FILM COATED ORAL 3 TIMES DAILY PRN
Qty: 30 TABLET | Refills: 0 | Status: SHIPPED | OUTPATIENT
Start: 2023-04-11 | End: 2023-04-18

## 2023-04-11 NOTE — TELEPHONE ENCOUNTER
Very unclear what she needs?  Does she want results of labs or verbal orders to refill medications?  And whose labs that she is talking about

## 2023-04-11 NOTE — TELEPHONE ENCOUNTER
Caller: Darius Bernardo    Relationship: Emergency Contact    Best call back number: [phone]    Caller requesting test results:  (ONLY ON [facility] FOR MED REFILLS)    What test was performed: BLOOD WORK     When was the test performed: 4/7/23    Where was the test performed: IN OFFICE     Additional notes:

## 2023-04-11 NOTE — TELEPHONE ENCOUNTER
Patient informed re: elevated LFTs what suggestions did she have and also she is having vaginal pain and low back pain, taking tylenol doesn't seem to be enough? Thought maybe pyriduim would help? Advise said saw and discussed you Friday?

## 2023-04-18 ENCOUNTER — OFFICE VISIT (OUTPATIENT)
Dept: FAMILY MEDICINE CLINIC | Facility: CLINIC | Age: 63
End: 2023-04-18
Payer: COMMERCIAL

## 2023-04-18 VITALS
HEART RATE: 91 BPM | SYSTOLIC BLOOD PRESSURE: 120 MMHG | TEMPERATURE: 98 F | WEIGHT: 163.4 LBS | BODY MASS INDEX: 30.07 KG/M2 | HEIGHT: 62 IN | DIASTOLIC BLOOD PRESSURE: 72 MMHG | OXYGEN SATURATION: 96 %

## 2023-04-18 DIAGNOSIS — K76.0 FATTY LIVER: ICD-10-CM

## 2023-04-18 DIAGNOSIS — K44.9 PARAESOPHAGEAL HIATAL HERNIA: ICD-10-CM

## 2023-04-18 DIAGNOSIS — N39.0 URINARY TRACT INFECTION WITHOUT HEMATURIA, SITE UNSPECIFIED: Primary | ICD-10-CM

## 2023-04-18 DIAGNOSIS — D25.9 UTERINE LEIOMYOMA, UNSPECIFIED LOCATION: ICD-10-CM

## 2023-04-18 DIAGNOSIS — F41.9 ANXIETY: ICD-10-CM

## 2023-04-18 PROBLEM — I47.10 SVT (SUPRAVENTRICULAR TACHYCARDIA): Status: ACTIVE | Noted: 2023-04-18

## 2023-04-18 PROBLEM — I47.1 SVT (SUPRAVENTRICULAR TACHYCARDIA): Status: ACTIVE | Noted: 2023-04-18

## 2023-04-18 PROCEDURE — 99214 OFFICE O/P EST MOD 30 MIN: CPT | Performed by: INTERNAL MEDICINE

## 2023-04-18 RX ORDER — PRAVASTATIN SODIUM 40 MG
40 TABLET ORAL DAILY
Qty: 90 TABLET | Refills: 1 | Status: SHIPPED | OUTPATIENT
Start: 2023-04-18

## 2023-04-18 RX ORDER — CHOLESTYRAMINE 4 G/9G
POWDER, FOR SUSPENSION ORAL
Qty: 378 G | Refills: 3 | Status: SHIPPED | OUTPATIENT
Start: 2023-04-18

## 2023-04-18 RX ORDER — PANTOPRAZOLE SODIUM 40 MG/1
40 TABLET, DELAYED RELEASE ORAL DAILY
Qty: 90 TABLET | Refills: 1 | Status: SHIPPED | OUTPATIENT
Start: 2023-04-18

## 2023-04-18 RX ORDER — LORAZEPAM 1 MG/1
1 TABLET ORAL 2 TIMES DAILY PRN
Qty: 60 TABLET | Refills: 3 | Status: SHIPPED | OUTPATIENT
Start: 2023-04-18

## 2023-04-18 NOTE — PROGRESS NOTES
"Chief Complaint  4 month f/u and Med Refill    Subjective        Glenis Bernardo presents to [facility]  History of Present Illness patient was seen for had UA CNS ordered.  Patient has previously been diagnosed with UTI and given antibiotics.  Patient finished her antibiotics and is following up.  Patient did have a CT scan of the abdomen which showed a uterine fibroid with exophytic nodules.  Patient is being referred to GYN for evaluation.  She does have a history of fatty liver and is on Vit E 200 units daily.  Patient is also being referred to GI.  Patient does have a paraesophageal hiatal hernia.  Patient uses Protonix 40 mg daily for heartburn.  Patient's anxiety has been treated with lorazepam 1 mg twice daily.  Patient is stable with this treatment and will continue.    Dictated utilizing Dragon dictation. If there are questions or for further clarification, please contact me.        Objective   Vital Signs:  Blood Pressure 120/72   Pulse 91   Temperature 98 °F (36.7 °C)   Height 157.5 cm (62\")   Weight 74.1 kg (163 lb 6.4 oz)   Oxygen Saturation 96%   Body Mass Index 29.89 kg/m²   Estimated body mass index is 29.89 kg/m² as calculated from the following:    Height as of this encounter: 157.5 cm (62\").    Weight as of this encounter: 74.1 kg (163 lb 6.4 oz).             Physical Exam  Vitals and nursing note reviewed.   Constitutional:       Appearance: Normal appearance. She is well-developed.   HENT:      Head: Normocephalic and atraumatic.      Nose: Nose normal.      Mouth/Throat:      Mouth: Mucous membranes are moist.      Pharynx: Oropharynx is clear.   Eyes:      Extraocular Movements: Extraocular movements intact.      Conjunctiva/sclera: Conjunctivae normal.      Pupils: Pupils are equal, round, and reactive to light.   Cardiovascular:      Rate and Rhythm: Normal rate and regular rhythm.      Heart sounds: Normal heart sounds. No murmur heard.    No friction rub. " No gallop.   Pulmonary:      Effort: Pulmonary effort is normal. No respiratory distress.      Breath sounds: Normal breath sounds. No stridor. No wheezing, rhonchi or rales.   Chest:      Chest wall: No tenderness.   Abdominal:      General: Bowel sounds are normal.      Palpations: Abdomen is soft.   Musculoskeletal:         General: Normal range of motion.      Cervical back: Normal range of motion and neck supple.   Skin:     General: Skin is warm and dry.   Neurological:      General: No focal deficit present.      Mental Status: She is alert and oriented to person, place, and time. Mental status is at baseline.   Psychiatric:         Mood and Affect: Mood normal.         Behavior: Behavior normal.         Thought Content: Thought content normal.         Judgment: Judgment normal.        Result Review :                   Assessment and Plan  for 1 UA C&S #2 GYN referral for fibroid with exophytic nodule  Diagnoses and all orders for this visit:    1. Urinary tract infection without hematuria, site unspecified (Primary)  -     Urine Culture - Urine, Urine, Clean Catch  -     Urinalysis With Microscopic - Urine, Clean Catch    2. Anxiety  -     LORazepam (ATIVAN) 1 MG tablet; Take 1 tablet by mouth 2 (Two) Times a Day As Needed for Anxiety.  Dispense: 60 tablet; Refill: 3    3. Fatty liver    4. Paraesophageal hiatal hernia    5. Uterine leiomyoma, unspecified location  -     Ambulatory Referral to Obstetrics / Gynecology    Other orders  -     vitamin E 200 UNIT capsule; Take 1 capsule by mouth Daily.  Dispense: 90 capsule; Refill: 1  -     cholestyramine (QUESTRAN) 4 GM/DOSE powder; Mix 4 gm in liquid and take by mouth twice daily  Dispense: 378 g; Refill: 3  -     pravastatin (PRAVACHOL) 40 MG tablet; Take 1 tablet by mouth Daily.  Dispense: 90 tablet; Refill: 1  -     pantoprazole (PROTONIX) 40 MG EC tablet; Take 1 tablet by mouth Daily.  Dispense: 90 tablet; Refill: 1             Follow Up   Return in about  4 months (around 8/18/2023), or if symptoms worsen or fail to improve, for Recheck.  Patient was given instructions and counseling regarding her condition or for health maintenance advice. Please see specific information pulled into the AVS if appropriate.

## 2023-04-20 LAB
APPEARANCE UR: CLEAR
BACTERIA #/AREA URNS HPF: ABNORMAL /HPF
BACTERIA UR CULT: NO GROWTH
BACTERIA UR CULT: NORMAL
BILIRUB UR QL STRIP: NEGATIVE
CASTS URNS MICRO: ABNORMAL
COLOR UR: ABNORMAL
EPI CELLS #/AREA URNS HPF: ABNORMAL /HPF
GLUCOSE UR QL STRIP: NEGATIVE
HGB UR QL STRIP: ABNORMAL
KETONES UR QL STRIP: ABNORMAL
LEUKOCYTE ESTERASE UR QL STRIP: ABNORMAL
NITRITE UR QL STRIP: NEGATIVE
PH UR STRIP: 5.5 [PH] (ref 5–8)
PROT UR QL STRIP: NEGATIVE
RBC #/AREA URNS HPF: ABNORMAL /HPF
SP GR UR STRIP: 1.02 (ref 1–1.03)
UROBILINOGEN UR STRIP-MCNC: ABNORMAL MG/DL
WBC #/AREA URNS HPF: ABNORMAL /HPF

## 2023-04-21 DIAGNOSIS — R31.9 HEMATURIA, UNSPECIFIED TYPE: Primary | ICD-10-CM

## 2023-06-12 ENCOUNTER — OFFICE VISIT (OUTPATIENT)
Dept: GASTROENTEROLOGY | Facility: CLINIC | Age: 63
End: 2023-06-12
Payer: COMMERCIAL

## 2023-06-12 VITALS
OXYGEN SATURATION: 95 % | SYSTOLIC BLOOD PRESSURE: 122 MMHG | BODY MASS INDEX: 30.12 KG/M2 | WEIGHT: 163.7 LBS | TEMPERATURE: 98.6 F | DIASTOLIC BLOOD PRESSURE: 85 MMHG | HEART RATE: 106 BPM | HEIGHT: 62 IN

## 2023-06-12 DIAGNOSIS — K76.0 FATTY LIVER: Primary | ICD-10-CM

## 2023-06-12 DIAGNOSIS — R74.8 ELEVATED LIVER ENZYMES: ICD-10-CM

## 2023-06-12 PROCEDURE — 99213 OFFICE O/P EST LOW 20 MIN: CPT | Performed by: INTERNAL MEDICINE

## 2023-06-12 RX ORDER — ZINC OXIDE 13 %
CREAM (GRAM) TOPICAL
COMMUNITY

## 2023-06-12 NOTE — PROGRESS NOTES
"Chief Complaint   Patient presents with   • Elevated Hepatic Enzymes     Subjective     HPI  Glenis Bernardo is a 62 y.o. female who presents today for office follow up.     She has hx of elevated liver enzymes.  Relatively mild with AST>ALT.  Prior workup in 2018 with radiographic e/o hepatic steatosis, otherwise negative serologic workup  Endorses she drinks beer \"a few days a week\" Often heavy consumption.  BMI today is 30.      Objective   Vitals:    06/12/23 1531   BP: 122/85   Pulse: 106   Temp: 98.6 °F (37 °C)   SpO2: 95%       Physical Exam  Vitals reviewed.   Constitutional:       Appearance: She is well-developed.   HENT:      Head: Normocephalic and atraumatic.   Neurological:      Mental Status: She is alert and oriented to person, place, and time.   Psychiatric:         Behavior: Behavior normal.         Thought Content: Thought content normal.         Judgment: Judgment normal.                Assessment & Plan   Assessment:     1. Fatty liver    2. Elevated liver enzymes      Plan:   Suspect elevated liver enzymes secondary to combination of NELSON/NANDO  Recommend updating U/S liver  Recommend weight reduction and significant decrease in EtOH consumption  Office f/u 3mos          Juan Espinosa M.D.  [facility]  [address] [place] [address]  Office: [phone]  "

## 2023-07-07 PROBLEM — Z80.3 FAMILY HISTORY OF BREAST CANCER: Status: ACTIVE | Noted: 2023-07-07

## 2023-08-04 ENCOUNTER — HOSPITAL ENCOUNTER (OUTPATIENT)
Facility: HOSPITAL | Age: 63
Discharge: HOME | End: 2023-08-04
Admitting: OBSTETRICS & GYNECOLOGY
Payer: COMMERCIAL

## 2023-08-04 DIAGNOSIS — Z12.31 VISIT FOR SCREENING MAMMOGRAM: ICD-10-CM

## 2023-08-04 PROCEDURE — 77063 BREAST TOMOSYNTHESIS BI: CPT

## 2023-08-04 PROCEDURE — 77067 SCR MAMMO BI INCL CAD: CPT

## 2023-08-08 DIAGNOSIS — R92.8 ABNORMAL MAMMOGRAM: Primary | ICD-10-CM

## 2023-08-14 ENCOUNTER — APPOINTMENT (OUTPATIENT)
Dept: WOMENS IMAGING | Facility: HOSPITAL | Age: 63
End: 2023-08-14
Payer: COMMERCIAL

## 2023-08-14 PROCEDURE — 77061 BREAST TOMOSYNTHESIS UNI: CPT | Performed by: RADIOLOGY

## 2023-08-14 PROCEDURE — 77065 DX MAMMO INCL CAD UNI: CPT | Performed by: RADIOLOGY

## 2023-08-14 PROCEDURE — G0279 TOMOSYNTHESIS, MAMMO: HCPCS | Performed by: RADIOLOGY

## 2023-08-14 PROCEDURE — 76642 ULTRASOUND BREAST LIMITED: CPT | Performed by: RADIOLOGY

## 2023-08-17 DIAGNOSIS — Z12.31 VISIT FOR SCREENING MAMMOGRAM: Primary | ICD-10-CM

## 2023-08-21 DIAGNOSIS — Z12.31 ENCOUNTER FOR SCREENING MAMMOGRAM FOR MALIGNANT NEOPLASM OF BREAST: Primary | ICD-10-CM

## 2023-08-21 DIAGNOSIS — R92.8 ABNORMAL MAMMOGRAM: ICD-10-CM

## 2024-02-15 ENCOUNTER — APPOINTMENT (OUTPATIENT)
Dept: WOMENS IMAGING | Facility: HOSPITAL | Age: 64
End: 2024-02-15
Payer: COMMERCIAL

## 2024-02-15 PROCEDURE — 77065 DX MAMMO INCL CAD UNI: CPT | Performed by: RADIOLOGY

## 2024-02-15 PROCEDURE — 76642 ULTRASOUND BREAST LIMITED: CPT | Performed by: RADIOLOGY

## 2024-02-15 PROCEDURE — 77061 BREAST TOMOSYNTHESIS UNI: CPT | Performed by: RADIOLOGY

## 2024-02-15 PROCEDURE — G0279 TOMOSYNTHESIS, MAMMO: HCPCS | Performed by: RADIOLOGY

## 2024-02-20 DIAGNOSIS — N64.89 BREAST ASYMMETRY: ICD-10-CM

## 2024-02-20 DIAGNOSIS — R92.8 ABNORMAL MAMMOGRAM: Primary | ICD-10-CM

## 2024-07-23 ENCOUNTER — TELEPHONE (OUTPATIENT)
Dept: OBSTETRICS AND GYNECOLOGY | Age: 64
End: 2024-07-23

## 2024-07-23 NOTE — TELEPHONE ENCOUNTER
Caller: Darius Bernardo    Relationship to patient: Emergency Contact    Best call back number: [phone]    Patient is needing: PATIENTS  CALLED IN REQUESTING TO RESCHEDULE PATIENTS MMG AND ANNUAL THAT IS SCHEDULED FOR 08/15 WITH . HE STATED THEY PREFER EARLY MORNINGS OR LATE AFTERNOONS DUE TO HIM NEEDING TO BRING HER TO THE APPT. OKAY TO LEAVE DETAILED VM. HUB UNABLE TO FIND ANYTHING SOON.

## 2024-08-14 ENCOUNTER — TELEPHONE (OUTPATIENT)
Dept: OBSTETRICS AND GYNECOLOGY | Age: 64
End: 2024-08-14
Payer: COMMERCIAL

## 2024-08-14 NOTE — TELEPHONE ENCOUNTER
I called pts  regarding her screening mammo appt on 8/15/24, pt can't have screening done bc she needs dx f/u. Pt had an appt scheduled at [facility] on 8/15/24 as well but  unaware of that appt. Pts  states pt has had 6mo f/u in the past and that is is expensive to have done. I told pt that I would cancel both appts for now and he can talk with provider regarding f/u. [facility] states that per the ACR follow up can last up to 2 years and that dx imaging must be done before pt can return to screening unless its been 2 years from the last time pt was seen.

## 2024-08-15 ENCOUNTER — OFFICE VISIT (OUTPATIENT)
Dept: OBSTETRICS AND GYNECOLOGY | Age: 64
End: 2024-08-15
Payer: COMMERCIAL

## 2024-08-15 VITALS
HEIGHT: 62 IN | WEIGHT: 160 LBS | DIASTOLIC BLOOD PRESSURE: 82 MMHG | SYSTOLIC BLOOD PRESSURE: 140 MMHG | BODY MASS INDEX: 29.44 KG/M2

## 2024-08-15 DIAGNOSIS — Z01.419 ENCOUNTER FOR GYNECOLOGICAL EXAMINATION: Primary | ICD-10-CM

## 2024-08-15 DIAGNOSIS — M54.50 ACUTE BILATERAL LOW BACK PAIN WITHOUT SCIATICA: ICD-10-CM

## 2024-08-15 DIAGNOSIS — R10.9 ABDOMINAL DISCOMFORT: ICD-10-CM

## 2024-08-15 LAB
BILIRUB BLD-MCNC: ABNORMAL MG/DL
CLARITY, POC: CLEAR
COLOR UR: YELLOW
GLUCOSE UR STRIP-MCNC: NEGATIVE MG/DL
KETONES UR QL: NEGATIVE
LEUKOCYTE EST, POC: NEGATIVE
NITRITE UR-MCNC: NEGATIVE MG/ML
PH UR: 5.5 [PH] (ref 5–8)
PROT UR STRIP-MCNC: NEGATIVE MG/DL
RBC # UR STRIP: ABNORMAL /UL
SP GR UR: 1.02 (ref 1–1.03)
UROBILINOGEN UR QL: NORMAL

## 2024-08-15 RX ORDER — DULOXETIN HYDROCHLORIDE 30 MG/1
30 CAPSULE, DELAYED RELEASE ORAL
COMMUNITY
Start: 2024-05-15 | End: 2024-11-11

## 2024-08-15 RX ORDER — EZETIMIBE 10 MG/1
10 TABLET ORAL DAILY
COMMUNITY
Start: 2024-05-15 | End: 2024-11-11

## 2024-08-15 RX ORDER — PRAVASTATIN SODIUM 80 MG/1
80 TABLET ORAL DAILY
COMMUNITY
Start: 2024-05-15 | End: 2025-05-15

## 2024-08-15 NOTE — PROGRESS NOTES
Subjective       History of Present Illness    Chief Complaint   Patient presents with    Gynecologic Exam     Annual:last pap ,mammo ,colonoscopy ,dexa ,Discuss lower back pain and abdominal discomfort x 1 week       Glenis Bernardo is a 63 y.o. female who presents for annual exam.  Patient of   Did genetic testing last year due to her sister having breast cancer at 49 and it returned negative  She did have follow up breast imaging ordered today but canceled it due to cost  Her  has to have cataract surgery and that is expensive- they plan to reschedule when they can  No vaginal bleeding  No bowel changes or problems  Patient reports that she is not currently experiencing any symptoms of urinary incontinence.    The following portions of the patient's history were reviewed and updated as appropriate: allergies, current medications, past family history, past medical history, past social history, past surgical history and problem list.    Current contraception: post menopausal status  History of abnormal Pap smear: no  Received Gardasil immunization: no  Perform regular self breast exam : no  Family history of uterine or ovarian cancer: no  Family History of colon cancer: no  Family history of breast cancer: Sister at age 49.     Mammogram: ordered.  Colonoscopy: up to date.   DEXA: not indicated.     Exercise: not active  Calcium/Vitamin D: inadequate intake    Social History    Tobacco Use      Smoking status: Former        Packs/day: 0.00        Years: 1 pack/day for 35.0 years (35.0 ttl pk-yrs)        Types: Cigarettes, Electronic Cigarette        Start date: 1978        Quit date: 2013        Years since quittin.6      Smokeless tobacco: Never      Tobacco comments: cig use for 35 years, then e-cig use for 4 years     Exercise: not active  Calcium/Vitamin D: uses supplements    The following portions of the patient's history were reviewed and updated as appropriate:  "allergies, current medications, past family history, past medical history, past social history, past surgical history, and problem list.    Review of Systems   Constitutional: Negative.    HENT: Negative.     Eyes: Negative.    Respiratory: Negative.     Cardiovascular: Negative.    Gastrointestinal: Negative.    Endocrine: Negative.    Genitourinary: Negative.    Musculoskeletal: Negative.    Skin: Negative.    Allergic/Immunologic: Negative.    Neurological: Negative.    Hematological: Negative.    Psychiatric/Behavioral: Negative.           Objective   Physical Exam  Vitals reviewed.   Constitutional:       Appearance: She is well-developed.   Neck:      Thyroid: No thyroid mass.   Cardiovascular:      Rate and Rhythm: Normal rate and regular rhythm.      Heart sounds: Normal heart sounds.   Pulmonary:      Effort: Pulmonary effort is normal.      Breath sounds: Normal breath sounds.   Chest:   Breasts:     Right: No mass, nipple discharge, skin change or tenderness.      Left: No mass, nipple discharge, skin change or tenderness.   Abdominal:      Palpations: Abdomen is soft.      Tenderness: There is no abdominal tenderness.   Genitourinary:     Labia:         Right: No rash or lesion.         Left: No rash or lesion.       Vagina: Normal.      Cervix: No cervical motion tenderness, discharge or friability.      Adnexa:         Right: No mass or tenderness.          Left: No mass or tenderness.     Neurological:      Mental Status: She is alert and oriented to person, place, and time.   Psychiatric:         Behavior: Behavior normal.         /82   Ht 157.5 cm (62\")   Wt 72.6 kg (160 lb)   BMI 29.26 kg/m²     Assessment & Plan   Diagnoses and all orders for this visit:    1. Encounter for gynecological examination (Primary)  -     POC Urinalysis Dipstick  -     IGP, Apt HPV,rfx 16 / 18,45    2. Acute bilateral low back pain without sciatica  -     POC Urinalysis Dipstick    3. Abdominal discomfort  -    "  POC Urinalysis Dipstick          Breast self exam technique reviewed and patient encouraged to perform self-exam monthly.  Discussed healthy lifestyle modifications.  Pap smear done with HPV  Recommended 30 minutes of aerobic exercise five times per week.  Discussed calcium needs to prevent osteoporosis  Patient will reschedule breast imaging

## 2024-08-20 LAB
CYTOLOGIST CVX/VAG CYTO: NORMAL
CYTOLOGY CVX/VAG DOC CYTO: NORMAL
CYTOLOGY CVX/VAG DOC THIN PREP: NORMAL
DX ICD CODE: NORMAL
HPV I/H RISK 4 DNA CVX QL PROBE+SIG AMP: NEGATIVE
Lab: NORMAL
OTHER STN SPEC: NORMAL
STAT OF ADQ CVX/VAG CYTO-IMP: NORMAL

## 2024-08-28 ENCOUNTER — APPOINTMENT (OUTPATIENT)
Dept: WOMENS IMAGING | Facility: HOSPITAL | Age: 64
End: 2024-08-28
Payer: COMMERCIAL

## 2024-08-28 PROCEDURE — 76642 ULTRASOUND BREAST LIMITED: CPT | Performed by: RADIOLOGY

## 2024-08-28 PROCEDURE — G0279 TOMOSYNTHESIS, MAMMO: HCPCS | Performed by: RADIOLOGY

## 2024-08-28 PROCEDURE — 77066 DX MAMMO INCL CAD BI: CPT | Performed by: RADIOLOGY

## 2024-08-28 PROCEDURE — 77062 BREAST TOMOSYNTHESIS BI: CPT | Performed by: RADIOLOGY

## 2024-09-03 DIAGNOSIS — N64.89 BREAST ASYMMETRY: Primary | ICD-10-CM

## 2024-09-03 DIAGNOSIS — Z09 FOLLOW-UP EXAM, 7 MONTHS TO 1 YEAR SINCE PREVIOUS EXAM: ICD-10-CM

## 2025-06-30 ENCOUNTER — OFFICE VISIT (OUTPATIENT)
Dept: SURGERY | Facility: CLINIC | Age: 65
End: 2025-06-30
Payer: COMMERCIAL

## 2025-06-30 VITALS
BODY MASS INDEX: 29.44 KG/M2 | DIASTOLIC BLOOD PRESSURE: 100 MMHG | HEIGHT: 62 IN | SYSTOLIC BLOOD PRESSURE: 150 MMHG | OXYGEN SATURATION: 95 % | HEART RATE: 102 BPM | WEIGHT: 160 LBS

## 2025-06-30 DIAGNOSIS — K44.9 HIATAL HERNIA: Primary | ICD-10-CM

## 2025-06-30 RX ORDER — AZITHROMYCIN 250 MG/1
TABLET, FILM COATED ORAL
COMMUNITY
Start: 2025-06-10

## 2025-06-30 RX ORDER — FLUOROMETHOLONE 1 MG/ML
SUSPENSION/ DROPS OPHTHALMIC
COMMUNITY
Start: 2025-06-24

## 2025-06-30 RX ORDER — FLUTICASONE PROPIONATE 50 MCG
2 SPRAY, SUSPENSION (ML) NASAL DAILY
COMMUNITY
Start: 2025-06-03 | End: 2026-06-03

## 2025-06-30 RX ORDER — ALBUTEROL SULFATE 90 UG/1
2 INHALANT RESPIRATORY (INHALATION) EVERY 4 HOURS PRN
COMMUNITY
Start: 2025-06-03 | End: 2026-06-03

## 2025-06-30 NOTE — H&P (VIEW-ONLY)
General Surgery Office Visit    Referring Provider:   Skyler Velasquez MD    Chief Complaint:    Cough, hiatal hernia    History of Present Illness:    Glenis Bernardo is a 64 y.o. female who presents accompanied by her  for evaluation of a hiatal hernia. The patient recently presented to her PCP complaining of a productive cough lasting for the last 3 weeks. She reported associated hoarseness, ocular discomfort, and back pain. She denies associated nausea, vomiting, or heartburn. She occasionally has upper abdominal pain along her braline but thinks it may be due to her bra irritating her. Denies any other associations with the pain such as after eating.  She reports chronic diarrhea every since her cholecystectomy which is well controlled with PRN imodium. She has taken pantoprazole daily for several years and feels this controls her reflux symptoms. She also reports relief of her cough with lying supine. She denies dysphagia or odynophagia. She has not had any unexplained weight loss. Denies any blood in her stool.  After she was started on antibiotics, inhaler, and intranasal steroid, the patient states that her cough is better and that her back pain is resolved.    Past Medical History:   Gallstones  Diverticulosis  Fatty liver disease  Left eye melanoma  UTIs  History of colon polyps  History of radiation therapy  Hyperlipidemia     Past Surgical History:    Laparoscopic cholecystectomy with IOC 12/29/2021 by Dr. Tenzin Rivera Jr.  EGD 8/5/2021 by Dr. Espinosa  Colonoscopy 2/12/2018 by Dr. Espinosa  Left breast biopsy  Left eye surgery    Family History:    Denies family history of colon or rectal cancer or any other GI tract cancers  Brother had prostate cancer  Sister had skin cancer and breast cancer    Social History:      Former smoker  Drinks 2 alcoholic drinks per week  Denies recreational drug use    Allergies:   Allergies   Allergen Reactions    Oxycodone-Acetaminophen Nausea Only     Rosuvastatin Myalgia   Patient also reports seasonal allergies    Medications:     Current Outpatient Medications:     albuterol sulfate  (90 Base) MCG/ACT inhaler, Inhale 2 puffs Every 4 (Four) Hours As Needed for Wheezing or Shortness of Air., Disp: , Rfl:     azithromycin (ZITHROMAX) 250 MG tablet, , Disp: , Rfl:     coenzyme Q10 100 MG capsule, Take 1 capsule by mouth Daily., Disp: , Rfl:     fluorometholone (FML) 0.1 % ophthalmic suspension, , Disp: , Rfl:     fluticasone (FLONASE) 50 MCG/ACT nasal spray, Administer 2 sprays into the nostril(s) as directed by provider Daily., Disp: , Rfl:     LORazepam (ATIVAN) 1 MG tablet, Take 1 tablet by mouth 2 (Two) Times a Day As Needed for Anxiety., Disp: 60 tablet, Rfl: 3    Olopatadine HCl (PATADAY OP), Apply  to eye(s) as directed by provider Daily. Left eye, Disp: , Rfl:     pantoprazole (PROTONIX) 40 MG EC tablet, Take 1 tablet by mouth Daily., Disp: 90 tablet, Rfl: 1    pravastatin (PRAVACHOL) 80 MG tablet, Take 1 tablet by mouth Daily., Disp: , Rfl:     Probiotic Product (Probiotic Daily) capsule, Take  by mouth., Disp: , Rfl:     vitamin D3 125 MCG (5000 UT) capsule capsule, Take 1 capsule by mouth Daily., Disp: 30 capsule, Rfl: 3    vitamin E 200 UNIT capsule, Take 1 capsule by mouth Daily., Disp: 90 capsule, Rfl: 1    cholestyramine (QUESTRAN) 4 GM/DOSE powder, Mix 4 gm in liquid and take by mouth twice daily (Patient not taking: Reported on 6/30/2025), Disp: 378 g, Rfl: 3    DULoxetine (CYMBALTA) 30 MG capsule, Take 1 capsule by mouth. (Patient not taking: Reported on 8/15/2024), Disp: , Rfl:     ezetimibe (ZETIA) 10 MG tablet, Take 1 tablet by mouth Daily., Disp: , Rfl:     loperamide (IMODIUM) 2 MG capsule, Take 1 capsule by mouth 4 (Four) Times a Day As Needed for Diarrhea. (Patient not taking: Reported on 6/30/2025), Disp: , Rfl:     pravastatin (PRAVACHOL) 40 MG tablet, Take 1 tablet by mouth Daily. (Patient not taking: Reported on 6/30/2025),  "Disp: 90 tablet, Rfl: 1    Review of Systems:   Constitutional: denies fevers, chills  Resp: + cough, denies shortness of breath  CV: denies chest pain, heart palpitations  GI: +epigastric abdominal pain, denies nausea, vomiting, +chronic diarrhea, denies constipation, melena, hematochezia  : denies hematuria, dysuria  Skin: denies rash    Physical Exam:   /100   Pulse 102   Ht 157.5 cm (62.01\")   Wt 72.6 kg (160 lb)   SpO2 95%   BMI 29.26 kg/m²   Constitutional: Well-developed, well-nourished  Respiratory: Normal inspiratory effort  Cardiovascular: Regular rate, no peripheral edema  Gastrointestinal: Abd soft, nontender, nondistended, no guarding  Lymphatics: No palpable lymphadenopathy  Musculoskeletal: Symmetric strength  Psychiatric: Normal mood and affect  Skin:  Warm, dry, no rash on visualized skin surfaces    Radiology/Endoscopy:    CT abdomen and pelvis 9/7/2022 images and report reviewed - She has a moderate-sized paraesophageal hernia with half the stomach above the diaphragm that is incompletely imaged on this scan.    EGD by Dr. Espinosa 8/5/21 images and report reviewed - patient had a mild Schatzki ring found at the GE junction.  Biopsies were taken for histology and ring disruption.  A 3 cm hiatal hernia was present.  The entire stomach was normal.  Biopsies were taken for H. pylori testing.  The entire duodenum examined to the fourth part was normal.  Pathology demonstrated mild gastritis with no H. pylori.  The patient was recommended to continue her PPI.    Labs:    CBC 6/10/2025-WBC 8.3, hemoglobin 14.7, hematocrit 44.9, platelets 243  CMP 6/10/2025-glucose 113, BUN 7, creatinine 0.6, BUN/creatinine ratio 11.7, sodium 141, potassium 5.7, chloride 102, total CO2 27, calcium 9.5, total protein 6.7, albumin 4.2, total bilirubin 0.7, alkaline phosphatase 67, AST 27, ALT 28, EGFR 101    Assessment/Plan:  64 year old lady with a hiatal hernia presenting for evaluation after developing a " nagging cough for about 3 weeks. It appears her cough symptoms have improved with typical respiratory therapies. She has been on a PPI for years and was previously diagnosed with a Schatzki's ring and gastritis on EGD performed in 2021, and limited CT from 2022 demonstrates a moderate-sized paraesophageal hernia with about half her stomach above her diaphragm. I discussed that it is possible that silent reflux could have contributed to her symptoms, and further workup for elective hiatal hernia repair is recommended. I discussed with recommendation for upper GI contrast study to evaluate anatomy and esophageal motility, followed by EGD to evaluate for esophagitis/Sutton's, ulcers, and rule out malignancy prior to possible surgery. The patient is agreeable with the recommendations.     Upper GI and EGD scheduling started.    Yojana Gonzalez M.D.  General, Robotic, and Endoscopic Surgery  [facility]    [address], [address]  [place], [place], [address]  P: [phone]  F: [phone]

## 2025-06-30 NOTE — PROGRESS NOTES
General Surgery Office Visit    Referring Provider:   Skyler Velasquez MD    Chief Complaint:    Cough, hiatal hernia    History of Present Illness:    Glenis Bernardo is a 64 y.o. female who presents accompanied by her  for evaluation of a hiatal hernia. The patient recently presented to her PCP complaining of a productive cough lasting for the last 3 weeks. She reported associated hoarseness, ocular discomfort, and back pain. She denies associated nausea, vomiting, or heartburn. She occasionally has upper abdominal pain along her braline but thinks it may be due to her bra irritating her. Denies any other associations with the pain such as after eating.  She reports chronic diarrhea every since her cholecystectomy which is well controlled with PRN imodium. She has taken pantoprazole daily for several years and feels this controls her reflux symptoms. She also reports relief of her cough with lying supine. She denies dysphagia or odynophagia. She has not had any unexplained weight loss. Denies any blood in her stool.  After she was started on antibiotics, inhaler, and intranasal steroid, the patient states that her cough is better and that her back pain is resolved.    Past Medical History:   Gallstones  Diverticulosis  Fatty liver disease  Left eye melanoma  UTIs  History of colon polyps  History of radiation therapy  Hyperlipidemia     Past Surgical History:    Laparoscopic cholecystectomy with IOC 12/29/2021 by Dr. Tenzin Rivera Jr.  EGD 8/5/2021 by Dr. Espinosa  Colonoscopy 2/12/2018 by Dr. Espinosa  Left breast biopsy  Left eye surgery    Family History:    Denies family history of colon or rectal cancer or any other GI tract cancers  Brother had prostate cancer  Sister had skin cancer and breast cancer    Social History:      Former smoker  Drinks 2 alcoholic drinks per week  Denies recreational drug use    Allergies:   Allergies   Allergen Reactions    Oxycodone-Acetaminophen Nausea Only     Rosuvastatin Myalgia   Patient also reports seasonal allergies    Medications:     Current Outpatient Medications:     albuterol sulfate  (90 Base) MCG/ACT inhaler, Inhale 2 puffs Every 4 (Four) Hours As Needed for Wheezing or Shortness of Air., Disp: , Rfl:     azithromycin (ZITHROMAX) 250 MG tablet, , Disp: , Rfl:     coenzyme Q10 100 MG capsule, Take 1 capsule by mouth Daily., Disp: , Rfl:     fluorometholone (FML) 0.1 % ophthalmic suspension, , Disp: , Rfl:     fluticasone (FLONASE) 50 MCG/ACT nasal spray, Administer 2 sprays into the nostril(s) as directed by provider Daily., Disp: , Rfl:     LORazepam (ATIVAN) 1 MG tablet, Take 1 tablet by mouth 2 (Two) Times a Day As Needed for Anxiety., Disp: 60 tablet, Rfl: 3    Olopatadine HCl (PATADAY OP), Apply  to eye(s) as directed by provider Daily. Left eye, Disp: , Rfl:     pantoprazole (PROTONIX) 40 MG EC tablet, Take 1 tablet by mouth Daily., Disp: 90 tablet, Rfl: 1    pravastatin (PRAVACHOL) 80 MG tablet, Take 1 tablet by mouth Daily., Disp: , Rfl:     Probiotic Product (Probiotic Daily) capsule, Take  by mouth., Disp: , Rfl:     vitamin D3 125 MCG (5000 UT) capsule capsule, Take 1 capsule by mouth Daily., Disp: 30 capsule, Rfl: 3    vitamin E 200 UNIT capsule, Take 1 capsule by mouth Daily., Disp: 90 capsule, Rfl: 1    cholestyramine (QUESTRAN) 4 GM/DOSE powder, Mix 4 gm in liquid and take by mouth twice daily (Patient not taking: Reported on 6/30/2025), Disp: 378 g, Rfl: 3    DULoxetine (CYMBALTA) 30 MG capsule, Take 1 capsule by mouth. (Patient not taking: Reported on 8/15/2024), Disp: , Rfl:     ezetimibe (ZETIA) 10 MG tablet, Take 1 tablet by mouth Daily., Disp: , Rfl:     loperamide (IMODIUM) 2 MG capsule, Take 1 capsule by mouth 4 (Four) Times a Day As Needed for Diarrhea. (Patient not taking: Reported on 6/30/2025), Disp: , Rfl:     pravastatin (PRAVACHOL) 40 MG tablet, Take 1 tablet by mouth Daily. (Patient not taking: Reported on 6/30/2025),  "Disp: 90 tablet, Rfl: 1    Review of Systems:   Constitutional: denies fevers, chills  Resp: + cough, denies shortness of breath  CV: denies chest pain, heart palpitations  GI: +epigastric abdominal pain, denies nausea, vomiting, +chronic diarrhea, denies constipation, melena, hematochezia  : denies hematuria, dysuria  Skin: denies rash    Physical Exam:   /100   Pulse 102   Ht 157.5 cm (62.01\")   Wt 72.6 kg (160 lb)   SpO2 95%   BMI 29.26 kg/m²   Constitutional: Well-developed, well-nourished  Respiratory: Normal inspiratory effort  Cardiovascular: Regular rate, no peripheral edema  Gastrointestinal: Abd soft, nontender, nondistended, no guarding  Lymphatics: No palpable lymphadenopathy  Musculoskeletal: Symmetric strength  Psychiatric: Normal mood and affect  Skin:  Warm, dry, no rash on visualized skin surfaces    Radiology/Endoscopy:    CT abdomen and pelvis 9/7/2022 images and report reviewed - She has a moderate-sized paraesophageal hernia with half the stomach above the diaphragm that is incompletely imaged on this scan.    EGD by Dr. Espinosa 8/5/21 images and report reviewed - patient had a mild Schatzki ring found at the GE junction.  Biopsies were taken for histology and ring disruption.  A 3 cm hiatal hernia was present.  The entire stomach was normal.  Biopsies were taken for H. pylori testing.  The entire duodenum examined to the fourth part was normal.  Pathology demonstrated mild gastritis with no H. pylori.  The patient was recommended to continue her PPI.    Labs:    CBC 6/10/2025-WBC 8.3, hemoglobin 14.7, hematocrit 44.9, platelets 243  CMP 6/10/2025-glucose 113, BUN 7, creatinine 0.6, BUN/creatinine ratio 11.7, sodium 141, potassium 5.7, chloride 102, total CO2 27, calcium 9.5, total protein 6.7, albumin 4.2, total bilirubin 0.7, alkaline phosphatase 67, AST 27, ALT 28, EGFR 101    Assessment/Plan:  64 year old lady with a hiatal hernia presenting for evaluation after developing a " nagging cough for about 3 weeks. It appears her cough symptoms have improved with typical respiratory therapies. She has been on a PPI for years and was previously diagnosed with a Schatzki's ring and gastritis on EGD performed in 2021, and limited CT from 2022 demonstrates a moderate-sized paraesophageal hernia with about half her stomach above her diaphragm. I discussed that it is possible that silent reflux could have contributed to her symptoms, and further workup for elective hiatal hernia repair is recommended. I discussed with recommendation for upper GI contrast study to evaluate anatomy and esophageal motility, followed by EGD to evaluate for esophagitis/Sutton's, ulcers, and rule out malignancy prior to possible surgery. The patient is agreeable with the recommendations.     Upper GI and EGD scheduling started.    Yojana Gonzalez M.D.  General, Robotic, and Endoscopic Surgery  [facility]    [address], [address]  [place], [place], [address]  P: [phone]  F: [phone]

## 2025-07-17 ENCOUNTER — HOSPITAL ENCOUNTER (OUTPATIENT)
Dept: GENERAL RADIOLOGY | Facility: HOSPITAL | Age: 65
Discharge: HOME | End: 2025-07-17
Admitting: STUDENT IN AN ORGANIZED HEALTH CARE EDUCATION/TRAINING PROGRAM
Payer: COMMERCIAL

## 2025-07-17 DIAGNOSIS — K44.9 HIATAL HERNIA: ICD-10-CM

## 2025-07-17 PROCEDURE — 74246 X-RAY XM UPR GI TRC 2CNTRST: CPT

## 2025-07-23 ENCOUNTER — ANESTHESIA (OUTPATIENT)
Dept: GASTROENTEROLOGY | Facility: HOSPITAL | Age: 65
End: 2025-07-23
Payer: COMMERCIAL

## 2025-07-23 ENCOUNTER — ANESTHESIA EVENT (OUTPATIENT)
Dept: GASTROENTEROLOGY | Facility: HOSPITAL | Age: 65
End: 2025-07-23
Payer: COMMERCIAL

## 2025-07-23 ENCOUNTER — HOSPITAL ENCOUNTER (OUTPATIENT)
Facility: HOSPITAL | Age: 65
Setting detail: HOSPITAL OUTPATIENT SURGERY
Discharge: HOME | End: 2025-07-23
Attending: STUDENT IN AN ORGANIZED HEALTH CARE EDUCATION/TRAINING PROGRAM | Admitting: STUDENT IN AN ORGANIZED HEALTH CARE EDUCATION/TRAINING PROGRAM
Payer: COMMERCIAL

## 2025-07-23 VITALS
RESPIRATION RATE: 18 BRPM | OXYGEN SATURATION: 98 % | HEART RATE: 80 BPM | SYSTOLIC BLOOD PRESSURE: 106 MMHG | HEIGHT: 62 IN | WEIGHT: 160.3 LBS | DIASTOLIC BLOOD PRESSURE: 86 MMHG | BODY MASS INDEX: 29.5 KG/M2

## 2025-07-23 DIAGNOSIS — K44.9 HIATAL HERNIA: ICD-10-CM

## 2025-07-23 PROCEDURE — 25810000003 LACTATED RINGERS PER 1000 ML: Performed by: STUDENT IN AN ORGANIZED HEALTH CARE EDUCATION/TRAINING PROGRAM

## 2025-07-23 PROCEDURE — 25010000002 PROPOFOL 10 MG/ML EMULSION: Performed by: NURSE ANESTHETIST, CERTIFIED REGISTERED

## 2025-07-23 PROCEDURE — 25010000002 GLYCOPYRROLATE 0.2 MG/ML SOLUTION: Performed by: NURSE ANESTHETIST, CERTIFIED REGISTERED

## 2025-07-23 PROCEDURE — 43239 EGD BIOPSY SINGLE/MULTIPLE: CPT | Performed by: STUDENT IN AN ORGANIZED HEALTH CARE EDUCATION/TRAINING PROGRAM

## 2025-07-23 PROCEDURE — 25010000002 LIDOCAINE 2% SOLUTION: Performed by: NURSE ANESTHETIST, CERTIFIED REGISTERED

## 2025-07-23 PROCEDURE — 88305 TISSUE EXAM BY PATHOLOGIST: CPT | Performed by: STUDENT IN AN ORGANIZED HEALTH CARE EDUCATION/TRAINING PROGRAM

## 2025-07-23 RX ORDER — LIDOCAINE HYDROCHLORIDE 20 MG/ML
INJECTION, SOLUTION INFILTRATION; PERINEURAL AS NEEDED
Status: DISCONTINUED | OUTPATIENT
Start: 2025-07-23 | End: 2025-07-23 | Stop reason: SURG

## 2025-07-23 RX ORDER — GLYCOPYRROLATE 0.2 MG/ML
INJECTION INTRAMUSCULAR; INTRAVENOUS AS NEEDED
Status: DISCONTINUED | OUTPATIENT
Start: 2025-07-23 | End: 2025-07-23 | Stop reason: SURG

## 2025-07-23 RX ORDER — PROPOFOL 10 MG/ML
VIAL (ML) INTRAVENOUS AS NEEDED
Status: DISCONTINUED | OUTPATIENT
Start: 2025-07-23 | End: 2025-07-23 | Stop reason: SURG

## 2025-07-23 RX ORDER — SODIUM CHLORIDE, SODIUM LACTATE, POTASSIUM CHLORIDE, CALCIUM CHLORIDE 600; 310; 30; 20 MG/100ML; MG/100ML; MG/100ML; MG/100ML
30 INJECTION, SOLUTION INTRAVENOUS CONTINUOUS
Status: DISCONTINUED | OUTPATIENT
Start: 2025-07-23 | End: 2025-07-23 | Stop reason: HOSPADM

## 2025-07-23 RX ADMIN — PROPOFOL 160 MCG/KG/MIN: 10 INJECTION, EMULSION INTRAVENOUS at 12:52

## 2025-07-23 RX ADMIN — SODIUM CHLORIDE, POTASSIUM CHLORIDE, SODIUM LACTATE AND CALCIUM CHLORIDE 30 ML/HR: 600; 310; 30; 20 INJECTION, SOLUTION INTRAVENOUS at 11:47

## 2025-07-23 RX ADMIN — GLYCOPYRROLATE 0.2 MG: 0.2 INJECTION INTRAMUSCULAR; INTRAVENOUS at 12:48

## 2025-07-23 RX ADMIN — LIDOCAINE HYDROCHLORIDE 50 MG: 20 INJECTION, SOLUTION INFILTRATION; PERINEURAL at 12:51

## 2025-07-23 RX ADMIN — PROPOFOL 100 MG: 10 INJECTION, EMULSION INTRAVENOUS at 12:51

## 2025-07-23 NOTE — INTERVAL H&P NOTE
H&P reviewed. The patient was examined and there are no changes to the H&P.   Patient presents for EGD to evaluate hiatal hernia. Upper GI esophagram 717 images and report reviewed, demonstrating a moderate to large paraesophageal hiatal hernia with about half the stomach overlying the diaphragm, with no delay of passage of contrast or barium, no stricture, no mass.  There was reflux with the patient lying supine.  Discussed the results with the patient.  She is aware of the risk, benefits, alternatives, and postprocedure expectations of EGD and wishes to proceed.  Will make a follow-up appointment with her in the office following this to discuss further recommendations regarding her hiatal hernia management.  Patient agreeable.    Yojana Gonzalez M.D.  General, Robotic, and Endoscopic Surgery  [facility]    [address] [place] [address], [address]  [place], [place], [address]  P: [phone]  F: [phone]

## 2025-07-23 NOTE — OP NOTE
Operative Note :    Yojana Gonzalez MD    Patient Name:  Glenis Bernardo    Patient YOB: 1960    Procedure Date:   07/23/25    Pre-op Diagnosis:  Hiatal hernia [K44.9]  GERD    Post-Operative Diagnosis:  Hiatal hernia  GERD  Gastric fundus polyps   Esophageal polyps    Procedure:   Esophagogastroduodenoscopy with biopsy    Surgeon:   Yojana Gonzalez MD    Assistant:   None    Anesthesia:    MAC (monitored anesthetic care)    Estimated Blood Loss:   Minimal    Specimens:   A- Random duodenal biopsy  B- Random antral biopsy  C- Gastric fundus polyp, approximately 8mm, completely removed in piecemeal fashion  D- GE junction biopsy  E- Mid-esophageal polyp    Complications:   None    Indications:  Patient is a 64-year-old lady with a hiatal hernia presenting for preoperative evaluation for hiatal hernia repair.  Informed consent was obtained prior to EGD with biopsy.    Findings:   Specimens as above  Normal appearance of duodenum to second portion  No gross gastritis noted  A few small hyperplastic-appearing gastric and esophageal polyps noted, all sessile and under 1cm, one gastric polyp and one mid esophageal polyp biopsied as above  Approximately 7cm hiatal hernia without ulcer or gross evidence of Sutton's    Description of procedure:  The patient was brought to the endoscopy suite and kindra in the left lateral decubitus position.  A bite-block was inserted into the oropharynx.  Continuous propofol anesthesia was administered.  A surgical timeout was completed.  An upper endoscope was passed through the bite-block to the posterior oropharynx where the vocal cords and epiglottis were grossly normal.  The cervical esophagus was cannulated and the scope passed onward through the full length of the esophagus and into the stomach and first and second portions of the duodenum.  Antegrade and retroflex view of stomach performed.  There were findings as above. The stomach was desufflated and the scope was  then slowly withdrawn.  The patient was awoken and transferred to the recovery area in stable condition.    Recommendations:  Continue daily Protonix.    Follow up pathology.  Follow up in the office in 1 week to discuss further hiatal hernia management.     Yojana Gonzalez M.D.  General, Robotic, and Endoscopic Surgery  [facility]    [address], [address]  [place], [place], [address]  P: [phone]  F: [phone]

## 2025-07-23 NOTE — ANESTHESIA PREPROCEDURE EVALUATION
Anesthesia Evaluation     Patient summary reviewed and Nursing notes reviewed   NPO Solid Status: > 8 hours  NPO Liquid Status: > 8 hours           Airway   Mallampati: II  Neck ROM: full  No difficulty expected  Dental    (+) poor dentition    Comment: Missing teeth    Pulmonary     breath sounds clear to auscultation  (+) a smoker Former,  Cardiovascular     Rhythm: regular    (+) dysrhythmias Tachycardia, hyperlipidemia      Neuro/Psych  (+) psychiatric history Anxiety  GI/Hepatic/Renal/Endo    (+) hiatal hernia, liver disease fatty liver disease history of elevated LFT    Musculoskeletal     Abdominal   (+) obese   Substance History      OB/GYN          Other      history of cancer                Anesthesia Plan    ASA 2     MAC     intravenous induction     Anesthetic plan, risks, benefits, and alternatives have been provided, discussed and informed consent has been obtained with: patient.    CODE STATUS:

## 2025-07-23 NOTE — DISCHARGE INSTRUCTIONS
For the next 24 hours patient needs to be with a responsible adult.    For 24 hours DO NOT drive, operate machinery, appliances, drink alcohol, make important decisions or sign legal documents.    Start with a light or bland diet if you are feeling sick to your stomach otherwise advance to regular diet as tolerated.    Follow recommendations on procedure report if provided by your doctor.    Call Dr Gonzalez for problems [phone]. Await pathology result in 7-10 days.    Problems may include but not limited to: large amounts of bleeding, trouble breathing, repeated vomiting, severe unrelieved pain, fever or chills.

## 2025-07-23 NOTE — ANESTHESIA POSTPROCEDURE EVALUATION
"Patient: Glenis Bernardo    Procedure Summary       Date: 07/23/25 Room / Location:  [facility]    Anesthesia Start: 1247 Anesthesia Stop: 1315    Procedure: ESOPHAGOGASTRODUODENOSCOPY with cold biopsy polypectomies (Esophagus) Diagnosis:       Hiatal hernia      (Hiatal hernia [K44.9])    Surgeons: Yojana Gonzalez MD Provider: Wally Moya MD    Anesthesia Type: MAC ASA Status: 2            Anesthesia Type: MAC    Vitals  Vitals Value Taken Time   BP 96/51 07/23/25 13:16   Temp     Pulse 95 07/23/25 13:19   Resp 15 07/23/25 13:15   SpO2 94 % 07/23/25 13:19   Vitals shown include unfiled device data.        Post Anesthesia Care and Evaluation    Patient location during evaluation: bedside  Patient participation: complete - patient participated  Level of consciousness: sleepy but conscious  Pain score: 0  Pain management: adequate    Airway patency: patent  Anesthetic complications: No anesthetic complications    Cardiovascular status: acceptable  Respiratory status: acceptable  Hydration status: acceptable    Comments: BP 96/51 (BP Location: Left arm, Patient Position: Lying)   Pulse 96   Resp 15   Ht 157.5 cm (62\")   Wt 72.7 kg (160 lb 4.8 oz)   SpO2 96%   BMI 29.32 kg/m²       "

## 2025-07-25 ENCOUNTER — TELEPHONE (OUTPATIENT)
Dept: SURGERY | Facility: CLINIC | Age: 65
End: 2025-07-25
Payer: COMMERCIAL

## 2025-07-25 LAB
CYTO UR: NORMAL
LAB AP CASE REPORT: NORMAL
PATH REPORT.FINAL DX SPEC: NORMAL
PATH REPORT.GROSS SPEC: NORMAL

## 2025-07-25 NOTE — TELEPHONE ENCOUNTER
Called patient and spoke with her and her  about her EGD results.  She had a hiatal hernia with hyperplastic appearing gastric and esophageal polyps noted on gross exam.  Pathology from biopsies is as follows: Normal duodenal mucosa.  Chronic inactive gastritis of the stomach antrum, negative for H. pylori.  Fragments of fundic gland polyp of the stomach.  Squamocolumnar mucosa with focal intestinal metaplasia suggestive of developing Sutton's esophagus at the GE junction, negative for dysplasia.  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    Discussed with patient the above findings and will see her our next follow-up appointment on 8/7/2025 to discuss hiatal hernia repair with fundoplication and surveillance endoscopy for Sutton's esophagus. Patient agreeable with recommendations.    Yoajna Gonzalez M.D.  General, Robotic, and Endoscopic Surgery  [facility], [place], operated by [facility]    [address], [address]  [place], [place], [address]  P: [phone]  F: [phone]

## 2025-08-07 ENCOUNTER — OFFICE VISIT (OUTPATIENT)
Dept: SURGERY | Facility: CLINIC | Age: 65
End: 2025-08-07
Payer: COMMERCIAL

## 2025-08-07 VITALS
HEART RATE: 92 BPM | OXYGEN SATURATION: 98 % | WEIGHT: 160.2 LBS | SYSTOLIC BLOOD PRESSURE: 138 MMHG | HEIGHT: 62 IN | RESPIRATION RATE: 20 BRPM | BODY MASS INDEX: 29.48 KG/M2 | TEMPERATURE: 97.7 F | DIASTOLIC BLOOD PRESSURE: 88 MMHG

## 2025-08-07 DIAGNOSIS — K44.9 PARAESOPHAGEAL HERNIA: Primary | ICD-10-CM

## 2025-08-07 RX ORDER — ACETAMINOPHEN 325 MG/1
650 TABLET ORAL ONCE
OUTPATIENT
Start: 2025-08-07 | End: 2025-08-07

## (undated) DEVICE — SINGLE-USE BIOPSY FORCEPS: Brand: RADIAL JAW 4

## (undated) DEVICE — CATH CHOLANG 4.5F18IN BRGNDY

## (undated) DEVICE — FRCP BX RADJAW4 NDL 2.8 240CM LG OG BX40

## (undated) DEVICE — Device: Brand: DEFENDO AIR/WATER/SUCTION AND BIOPSY VALVE

## (undated) DEVICE — LN SMPL CO2 SHTRM SD STREAM W/M LUER

## (undated) DEVICE — STPCK 3WY D201 DISCOFIX

## (undated) DEVICE — APPL CHLORAPREP HI/LITE 26ML ORNG

## (undated) DEVICE — TUBING, SUCTION, 1/4" X 10', STRAIGHT: Brand: MEDLINE

## (undated) DEVICE — ADAPT CLN BIOGUARD AIR/H2O DISP

## (undated) DEVICE — ENDOPATH XCEL UNIVERSAL TROCAR STABLILITY SLEEVES: Brand: ENDOPATH XCEL

## (undated) DEVICE — 3M™ STERI-STRIP™ COMPOUND BENZOIN TINCTURE 40 BAGS/CARTON 4 CARTONS/CASE C1544: Brand: 3M™ STERI-STRIP™

## (undated) DEVICE — TBG 02 CRUSH RESIST LF CLR 7FT

## (undated) DEVICE — KT ORCA ORCAPOD DISP STRL

## (undated) DEVICE — BITEBLOCK OMNI BLOC

## (undated) DEVICE — CANN NASL CO2 TRULINK W/O2 A/

## (undated) DEVICE — GLV SURG PREMIERPRO ORTHO LTX PF SZ7.5 BRN

## (undated) DEVICE — CATH IV INSYTE AUTOGARD 14G 1 1/2IN ORNG

## (undated) DEVICE — DRAPE,REIN 53X77,STERILE: Brand: MEDLINE

## (undated) DEVICE — SOL NACL 0.9PCT 1000ML

## (undated) DEVICE — SNAR POLYP SENSATION STDOVL 27 240 BX40

## (undated) DEVICE — MSK ENDO PORT O2 POM ELITE CURAPLEX A/

## (undated) DEVICE — LAPAROSCOPIC SMOKE FILTRATION SYSTEM: Brand: PALL LAPAROSHIELD® PLUS LAPAROSCOPIC SMOKE FILTRATION SYSTEM

## (undated) DEVICE — ENDOPATH XCEL BLADELESS TROCARS WITH STABILITY SLEEVES: Brand: ENDOPATH XCEL

## (undated) DEVICE — ENDOPOUCH RETRIEVER SPECIMEN RETRIEVAL BAGS: Brand: ENDOPOUCH RETRIEVER

## (undated) DEVICE — SENSR O2 OXIMAX FNGR A/ 18IN NONSTR

## (undated) DEVICE — SUT MNCRYL PLS ANTIB UD 4/0 PS2 18IN

## (undated) DEVICE — MSK PROC CURAPLEX O2 2/ADAPT 7FT

## (undated) DEVICE — SUT VIC 0 TN 27IN DYED JTN0G

## (undated) DEVICE — ENDOPATH PNEUMONEEDLE INSUFFLATION NEEDLES WITH LUER LOCK CONNECTORS 120MM: Brand: ENDOPATH

## (undated) DEVICE — ENDOCUT SCISSOR TIP, DISPOSABLE: Brand: RENEW

## (undated) DEVICE — CONTAINER,SPECIMEN,OR STERILE,4OZ: Brand: MEDLINE

## (undated) DEVICE — THE TORRENT IRRIGATION SCOPE CONNECTOR IS USED WITH THE TORRENT IRRIGATION TUBING TO PROVIDE IRRIGATION FLUIDS SUCH AS STERILE WATER DURING GASTROINTESTINAL ENDOSCOPIC PROCEDURES WHEN USED IN CONJUNCTION WITH AN IRRIGATION PUMP (OR ELECTROSURGICAL UNIT).: Brand: TORRENT

## (undated) DEVICE — LOU LAP CHOLE: Brand: MEDLINE INDUSTRIES, INC.

## (undated) DEVICE — DRP C/ARM 41X74IN

## (undated) DEVICE — EXTENSION SET, MALE LUER LOCK ADAPTER WITH RETRACTABLE COLLAR

## (undated) DEVICE — BLCK/BITE BLOX W/DENTL/RIM W/STRAP 54F